# Patient Record
Sex: FEMALE | Race: WHITE | NOT HISPANIC OR LATINO | Employment: FULL TIME | URBAN - METROPOLITAN AREA
[De-identification: names, ages, dates, MRNs, and addresses within clinical notes are randomized per-mention and may not be internally consistent; named-entity substitution may affect disease eponyms.]

---

## 2018-05-31 ENCOUNTER — TRANSCRIBE ORDERS (OUTPATIENT)
Dept: ADMINISTRATIVE | Facility: HOSPITAL | Age: 48
End: 2018-05-31

## 2018-05-31 DIAGNOSIS — R61 GENERALIZED HYPERHIDROSIS: Primary | ICD-10-CM

## 2018-06-01 ENCOUNTER — HOSPITAL ENCOUNTER (OUTPATIENT)
Dept: RADIOLOGY | Age: 48
Discharge: HOME | End: 2018-06-01
Payer: COMMERCIAL

## 2018-06-01 DIAGNOSIS — R61 GENERALIZED HYPERHIDROSIS: ICD-10-CM

## 2018-06-01 PROCEDURE — 76536 US EXAM OF HEAD AND NECK: CPT

## 2018-06-21 ENCOUNTER — TRANSCRIBE ORDERS (OUTPATIENT)
Dept: ADMINISTRATIVE | Facility: HOSPITAL | Age: 48
End: 2018-06-21

## 2018-06-21 ENCOUNTER — HOSPITAL ENCOUNTER (INPATIENT)
Facility: HOSPITAL | Age: 48
LOS: 1 days | Discharge: HOME | DRG: 312 | End: 2018-06-23
Attending: EMERGENCY MEDICINE | Admitting: INTERNAL MEDICINE
Payer: COMMERCIAL

## 2018-06-21 ENCOUNTER — APPOINTMENT (EMERGENCY)
Dept: RADIOLOGY | Facility: HOSPITAL | Age: 48
DRG: 312 | End: 2018-06-21
Payer: COMMERCIAL

## 2018-06-21 DIAGNOSIS — R06.00 DYSPNEA ON EXERTION: Primary | ICD-10-CM

## 2018-06-21 DIAGNOSIS — R22.1 NECK MASS: Primary | ICD-10-CM

## 2018-06-21 DIAGNOSIS — R07.9 CHEST PAIN, UNSPECIFIED TYPE: ICD-10-CM

## 2018-06-21 DIAGNOSIS — R07.89 OTHER CHEST PAIN: ICD-10-CM

## 2018-06-21 DIAGNOSIS — R79.89 ABNORMAL THYROID BLOOD TEST: ICD-10-CM

## 2018-06-21 DIAGNOSIS — E07.89 THYROID MASS OF UNCLEAR ETIOLOGY: ICD-10-CM

## 2018-06-21 DIAGNOSIS — D50.9 MICROCYTIC ANEMIA: ICD-10-CM

## 2018-06-21 DIAGNOSIS — R00.2 PALPITATIONS: ICD-10-CM

## 2018-06-21 PROBLEM — Z72.0 TOBACCO ABUSE: Status: ACTIVE | Noted: 2018-06-21

## 2018-06-21 PROBLEM — Z98.84 HISTORY OF GASTRIC BYPASS: Status: ACTIVE | Noted: 2018-06-21

## 2018-06-21 LAB
ALBUMIN SERPL BCP-MCNC: 3.2 G/DL (ref 3.5–5)
ALP SERPL-CCNC: 97 U/L (ref 46–116)
ALT SERPL W P-5'-P-CCNC: 28 U/L (ref 12–78)
ANION GAP SERPL CALCULATED.3IONS-SCNC: 10 MMOL/L (ref 4–13)
APTT PPP: 26 SECONDS (ref 24–36)
AST SERPL W P-5'-P-CCNC: 24 U/L (ref 5–45)
BACTERIA UR QL AUTO: ABNORMAL /HPF
BASOPHILS # BLD AUTO: 0.04 THOUSANDS/ΜL (ref 0–0.1)
BASOPHILS NFR BLD AUTO: 1 % (ref 0–1)
BILIRUB SERPL-MCNC: 0.3 MG/DL (ref 0.2–1)
BILIRUB UR QL STRIP: NEGATIVE
BUN SERPL-MCNC: 16 MG/DL (ref 5–25)
CALCIUM SERPL-MCNC: 8.7 MG/DL (ref 8.3–10.1)
CHLORIDE SERPL-SCNC: 105 MMOL/L (ref 100–108)
CLARITY UR: CLEAR
CO2 SERPL-SCNC: 25 MMOL/L (ref 21–32)
COLOR UR: YELLOW
CREAT SERPL-MCNC: 0.7 MG/DL (ref 0.6–1.3)
DEPRECATED D DIMER PPP: 296 NG/ML (FEU) (ref 0–424)
EOSINOPHIL # BLD AUTO: 0.44 THOUSAND/ΜL (ref 0–0.61)
EOSINOPHIL NFR BLD AUTO: 5 % (ref 0–6)
ERYTHROCYTE [DISTWIDTH] IN BLOOD BY AUTOMATED COUNT: 17.4 % (ref 11.6–15.1)
GFR SERPL CREATININE-BSD FRML MDRD: 103 ML/MIN/1.73SQ M
GLUCOSE SERPL-MCNC: 100 MG/DL (ref 65–140)
GLUCOSE UR STRIP-MCNC: NEGATIVE MG/DL
HCT VFR BLD AUTO: 33.8 % (ref 34.8–46.1)
HGB BLD-MCNC: 10.5 G/DL (ref 11.5–15.4)
HGB UR QL STRIP.AUTO: NEGATIVE
INR PPP: 0.97 (ref 0.86–1.17)
KETONES UR STRIP-MCNC: NEGATIVE MG/DL
LEUKOCYTE ESTERASE UR QL STRIP: ABNORMAL
LYMPHOCYTES # BLD AUTO: 2.24 THOUSANDS/ΜL (ref 0.6–4.47)
LYMPHOCYTES NFR BLD AUTO: 28 % (ref 14–44)
MAGNESIUM SERPL-MCNC: 2 MG/DL (ref 1.6–2.6)
MCH RBC QN AUTO: 22.9 PG (ref 26.8–34.3)
MCHC RBC AUTO-ENTMCNC: 31.1 G/DL (ref 31.4–37.4)
MCV RBC AUTO: 74 FL (ref 82–98)
MONOCYTES # BLD AUTO: 0.5 THOUSAND/ΜL (ref 0.17–1.22)
MONOCYTES NFR BLD AUTO: 6 % (ref 4–12)
NEUTROPHILS # BLD AUTO: 4.89 THOUSANDS/ΜL (ref 1.85–7.62)
NEUTS SEG NFR BLD AUTO: 60 % (ref 43–75)
NITRITE UR QL STRIP: NEGATIVE
NON-SQ EPI CELLS URNS QL MICRO: ABNORMAL /HPF
PH UR STRIP.AUTO: 7 [PH] (ref 4.5–8)
PHOSPHATE SERPL-MCNC: 3.5 MG/DL (ref 2.7–4.5)
PLATELET # BLD AUTO: 300 THOUSANDS/UL (ref 149–390)
PMV BLD AUTO: 10.1 FL (ref 8.9–12.7)
POTASSIUM SERPL-SCNC: 3.6 MMOL/L (ref 3.5–5.3)
PROT SERPL-MCNC: 6.6 G/DL (ref 6.4–8.2)
PROT UR STRIP-MCNC: NEGATIVE MG/DL
PROTHROMBIN TIME: 12.6 SECONDS (ref 11.8–14.2)
RBC # BLD AUTO: 4.58 MILLION/UL (ref 3.81–5.12)
RBC #/AREA URNS AUTO: ABNORMAL /HPF
RETICS # AUTO: NORMAL 10*3/UL (ref 14097–95744)
RETICS # CALC: 0.81 % (ref 0.37–1.87)
SODIUM SERPL-SCNC: 140 MMOL/L (ref 136–145)
SP GR UR STRIP.AUTO: 1.01 (ref 1–1.03)
TROPONIN I SERPL-MCNC: <0.02 NG/ML
UROBILINOGEN UR QL STRIP.AUTO: 0.2 E.U./DL
WBC # BLD AUTO: 8.11 THOUSAND/UL (ref 4.31–10.16)
WBC #/AREA URNS AUTO: ABNORMAL /HPF

## 2018-06-21 PROCEDURE — 85045 AUTOMATED RETICULOCYTE COUNT: CPT | Performed by: INTERNAL MEDICINE

## 2018-06-21 PROCEDURE — 83550 IRON BINDING TEST: CPT | Performed by: INTERNAL MEDICINE

## 2018-06-21 PROCEDURE — 84100 ASSAY OF PHOSPHORUS: CPT | Performed by: INTERNAL MEDICINE

## 2018-06-21 PROCEDURE — 85730 THROMBOPLASTIN TIME PARTIAL: CPT | Performed by: PHYSICIAN ASSISTANT

## 2018-06-21 PROCEDURE — 83735 ASSAY OF MAGNESIUM: CPT | Performed by: INTERNAL MEDICINE

## 2018-06-21 PROCEDURE — 71046 X-RAY EXAM CHEST 2 VIEWS: CPT

## 2018-06-21 PROCEDURE — 93005 ELECTROCARDIOGRAM TRACING: CPT

## 2018-06-21 PROCEDURE — 99285 EMERGENCY DEPT VISIT HI MDM: CPT

## 2018-06-21 PROCEDURE — 96360 HYDRATION IV INFUSION INIT: CPT

## 2018-06-21 PROCEDURE — 85379 FIBRIN DEGRADATION QUANT: CPT | Performed by: INTERNAL MEDICINE

## 2018-06-21 PROCEDURE — 82728 ASSAY OF FERRITIN: CPT | Performed by: INTERNAL MEDICINE

## 2018-06-21 PROCEDURE — 36415 COLL VENOUS BLD VENIPUNCTURE: CPT | Performed by: INTERNAL MEDICINE

## 2018-06-21 PROCEDURE — 80053 COMPREHEN METABOLIC PANEL: CPT | Performed by: PHYSICIAN ASSISTANT

## 2018-06-21 PROCEDURE — 81001 URINALYSIS AUTO W/SCOPE: CPT

## 2018-06-21 PROCEDURE — 84484 ASSAY OF TROPONIN QUANT: CPT | Performed by: PHYSICIAN ASSISTANT

## 2018-06-21 PROCEDURE — 82607 VITAMIN B-12: CPT | Performed by: INTERNAL MEDICINE

## 2018-06-21 PROCEDURE — 83540 ASSAY OF IRON: CPT | Performed by: INTERNAL MEDICINE

## 2018-06-21 PROCEDURE — 99220 PR INITIAL OBSERVATION CARE/DAY 70 MINUTES: CPT | Performed by: INTERNAL MEDICINE

## 2018-06-21 PROCEDURE — 82746 ASSAY OF FOLIC ACID SERUM: CPT | Performed by: INTERNAL MEDICINE

## 2018-06-21 PROCEDURE — 85610 PROTHROMBIN TIME: CPT | Performed by: PHYSICIAN ASSISTANT

## 2018-06-21 PROCEDURE — 85025 COMPLETE CBC W/AUTO DIFF WBC: CPT | Performed by: PHYSICIAN ASSISTANT

## 2018-06-21 RX ORDER — MELATONIN
5000 DAILY
Status: DISCONTINUED | OUTPATIENT
Start: 2018-06-22 | End: 2018-06-23 | Stop reason: HOSPADM

## 2018-06-21 RX ORDER — ACETAMINOPHEN 325 MG/1
650 TABLET ORAL EVERY 6 HOURS PRN
Status: DISCONTINUED | OUTPATIENT
Start: 2018-06-21 | End: 2018-06-23 | Stop reason: HOSPADM

## 2018-06-21 RX ORDER — MELATONIN
5000 DAILY
COMMUNITY
End: 2019-10-08

## 2018-06-21 RX ORDER — NITROGLYCERIN 0.4 MG/1
0.4 TABLET SUBLINGUAL
Status: DISCONTINUED | OUTPATIENT
Start: 2018-06-21 | End: 2018-06-23 | Stop reason: HOSPADM

## 2018-06-21 RX ADMIN — SODIUM CHLORIDE 1000 ML: 0.9 INJECTION, SOLUTION INTRAVENOUS at 21:08

## 2018-06-22 ENCOUNTER — APPOINTMENT (INPATIENT)
Dept: CT IMAGING | Facility: HOSPITAL | Age: 48
DRG: 312 | End: 2018-06-22
Payer: COMMERCIAL

## 2018-06-22 LAB
FERRITIN SERPL-MCNC: 5 NG/ML (ref 8–388)
FOLATE SERPL-MCNC: 19.8 NG/ML (ref 3.1–17.5)
IRON SATN MFR SERPL: 4 %
IRON SERPL-MCNC: 21 UG/DL (ref 50–170)
PLATELET # BLD AUTO: 296 THOUSANDS/UL (ref 149–390)
PMV BLD AUTO: 10.8 FL (ref 8.9–12.7)
T3 SERPL-MCNC: 0.9 NG/ML (ref 0.6–1.8)
TIBC SERPL-MCNC: 535 UG/DL (ref 250–450)
TROPONIN I SERPL-MCNC: <0.02 NG/ML
TSH SERPL DL<=0.05 MIU/L-ACNC: 1.56 UIU/ML (ref 0.36–3.74)
VIT B12 SERPL-MCNC: 287 PG/ML (ref 100–900)

## 2018-06-22 PROCEDURE — 99254 IP/OBS CNSLTJ NEW/EST MOD 60: CPT | Performed by: INTERNAL MEDICINE

## 2018-06-22 PROCEDURE — 84443 ASSAY THYROID STIM HORMONE: CPT | Performed by: PHYSICIAN ASSISTANT

## 2018-06-22 PROCEDURE — 99232 SBSQ HOSP IP/OBS MODERATE 35: CPT | Performed by: PHYSICIAN ASSISTANT

## 2018-06-22 PROCEDURE — 70498 CT ANGIOGRAPHY NECK: CPT

## 2018-06-22 PROCEDURE — 84484 ASSAY OF TROPONIN QUANT: CPT | Performed by: INTERNAL MEDICINE

## 2018-06-22 PROCEDURE — 85049 AUTOMATED PLATELET COUNT: CPT | Performed by: INTERNAL MEDICINE

## 2018-06-22 PROCEDURE — 84480 ASSAY TRIIODOTHYRONINE (T3): CPT | Performed by: PHYSICIAN ASSISTANT

## 2018-06-22 PROCEDURE — 70496 CT ANGIOGRAPHY HEAD: CPT

## 2018-06-22 RX ORDER — LORAZEPAM 2 MG/ML
0.5 INJECTION INTRAMUSCULAR 4 TIMES DAILY PRN
Status: DISCONTINUED | OUTPATIENT
Start: 2018-06-22 | End: 2018-06-23 | Stop reason: HOSPADM

## 2018-06-22 RX ORDER — FERROUS SULFATE 325(65) MG
325 TABLET ORAL 2 TIMES DAILY WITH MEALS
Status: DISCONTINUED | OUTPATIENT
Start: 2018-06-22 | End: 2018-06-23 | Stop reason: HOSPADM

## 2018-06-22 RX ORDER — POTASSIUM CHLORIDE 20 MEQ/1
40 TABLET, EXTENDED RELEASE ORAL ONCE
Status: COMPLETED | OUTPATIENT
Start: 2018-06-22 | End: 2018-06-22

## 2018-06-22 RX ORDER — DOCUSATE SODIUM 100 MG/1
100 CAPSULE, LIQUID FILLED ORAL 2 TIMES DAILY
Status: DISCONTINUED | OUTPATIENT
Start: 2018-06-22 | End: 2018-06-23 | Stop reason: HOSPADM

## 2018-06-22 RX ORDER — ALBUTEROL SULFATE 90 UG/1
2 AEROSOL, METERED RESPIRATORY (INHALATION) EVERY 4 HOURS PRN
Status: DISCONTINUED | OUTPATIENT
Start: 2018-06-22 | End: 2018-06-23 | Stop reason: HOSPADM

## 2018-06-22 RX ADMIN — IOHEXOL 85 ML: 350 INJECTION, SOLUTION INTRAVENOUS at 20:40

## 2018-06-22 RX ADMIN — DOCUSATE SODIUM 100 MG: 100 CAPSULE, LIQUID FILLED ORAL at 17:41

## 2018-06-22 RX ADMIN — FERROUS SULFATE TAB 325 MG (65 MG ELEMENTAL FE) 325 MG: 325 (65 FE) TAB at 17:41

## 2018-06-22 RX ADMIN — CHOLECALCIFEROL TAB 25 MCG (1000 UNIT) 5000 UNITS: 25 TAB at 08:45

## 2018-06-22 RX ADMIN — POTASSIUM CHLORIDE 40 MEQ: 1500 TABLET, EXTENDED RELEASE ORAL at 12:29

## 2018-06-22 NOTE — ASSESSMENT & PLAN NOTE
- hemoglobin of 11 2 last month at [facility] the dropped to 10 5 today (similar MCV in the mid 70s compared to last month)   - check iron panel and initiate supplementation if deficiency noted - h/o gastric bypass w/ failure to continue vitamin/mineral supplementation noted (see below)

## 2018-06-22 NOTE — ED PROVIDER NOTES
History  Chief Complaint   Patient presents with    Chest Pain     Running after grandson, not able to breath  Krystle Carota to the ground  Family reports she was gasping for air and did not respond for about 10 minutes  Patient reports chest discomfort that radiates down the left arm  Has recently been having cp and a stress test about 3 weeks ago  80-year-old female presents to the emergency department with complaints of shortness of breath  States she had a 10 minutes episode of difficulty breathing and "gasping for air" after running after her 3year-old granddaughter earlier today  States that she has had increasing shortness of breath with exertion over the past 3 weeks and is also having intermittent chest pain and palpitations  States she had a stress test done 3 weeks ago which was normal   Initially EMS with reports possible anxiety attack en route to the hospital   She denies previous history of anxiety disorder  History provided by:  Patient   used: No        Prior to Admission Medications   Prescriptions Last Dose Informant Patient Reported? Taking? cholecalciferol (VITAMIN D3) 1,000 units tablet   Yes Yes   Sig: Take 5,000 Units by mouth daily      Facility-Administered Medications: None       Past Medical History:   Diagnosis Date    Anxiety     Disease of thyroid gland        Past Surgical History:   Procedure Laterality Date    GASTRIC BYPASS      HYSTERECTOMY         History reviewed  No pertinent family history  I have reviewed and agree with the history as documented  Social History   Substance Use Topics    Smoking status: Current Every Day Smoker    Smokeless tobacco: Never Used    Alcohol use Yes      Comment: occassional        Review of Systems   Constitutional: Negative for activity change, appetite change, chills and fever     HENT: Negative for congestion, dental problem, drooling, ear discharge, ear pain, mouth sores, nosebleeds, rhinorrhea, sore throat and trouble swallowing  Eyes: Negative for pain, discharge and itching  Respiratory: Positive for shortness of breath  Negative for cough, chest tightness and wheezing  Cardiovascular: Negative for chest pain and palpitations  Gastrointestinal: Negative for abdominal pain, blood in stool, constipation, diarrhea, nausea and vomiting  Endocrine: Negative for cold intolerance and heat intolerance  Genitourinary: Negative for difficulty urinating, dysuria, flank pain, frequency and urgency  Skin: Negative for rash and wound  Allergic/Immunologic: Negative for food allergies and immunocompromised state  Neurological: Negative for dizziness, seizures, syncope, weakness, numbness and headaches  Psychiatric/Behavioral: Negative for agitation, behavioral problems and confusion  Physical Exam  Physical Exam   Constitutional: She is oriented to person, place, and time  She appears well-developed and well-nourished  No distress  HENT:   Head: Normocephalic and atraumatic  Right Ear: External ear normal    Left Ear: External ear normal    Mouth/Throat: Oropharynx is clear and moist  No oropharyngeal exudate  Eyes: Conjunctivae are normal    Neck: No JVD present  No tracheal deviation present  Cardiovascular: Normal rate, regular rhythm and normal heart sounds  Exam reveals no gallop and no friction rub  No murmur heard  Pulmonary/Chest: Effort normal and breath sounds normal  No respiratory distress  She has no wheezes  She has no rales  She exhibits no tenderness  Abdominal: Soft  Bowel sounds are normal  She exhibits no distension  There is no tenderness  There is no guarding  Musculoskeletal: Normal range of motion  She exhibits no edema, tenderness or deformity  Lymphadenopathy:     She has no cervical adenopathy  Neurological: She is alert and oriented to person, place, and time  Skin: Skin is warm and dry  No rash noted  She is not diaphoretic  No erythema  There is pallor  Psychiatric: She has a normal mood and affect  Her behavior is normal    Nursing note and vitals reviewed        Vital Signs  ED Triage Vitals [06/21/18 1927]   Temperature Pulse Respirations Blood Pressure SpO2   98 8 °F (37 1 °C) 80 20 136/79 94 %      Temp Source Heart Rate Source Patient Position - Orthostatic VS BP Location FiO2 (%)   Oral Monitor Lying Right arm --      Pain Score       No Pain           Vitals:    06/21/18 1927 06/21/18 2000 06/21/18 2015 06/21/18 2215   BP: 136/79 116/76 115/75 103/61   Pulse: 80 80 76 82   Patient Position - Orthostatic VS: Lying          Visual Acuity      ED Medications  Medications    EMS REPLENISHMENT MED (not administered)   sodium chloride 0 9 % bolus 1,000 mL (0 mL Intravenous Stopped 6/21/18 2208)       Diagnostic Studies  Results Reviewed     Procedure Component Value Units Date/Time    Urine Microscopic [[id number]]  (Abnormal) Collected:  06/21/18 2043    Lab Status:  Final result Specimen:  Urine from Urine, Clean Catch Updated:  06/21/18 2155     RBC, UA 0-1 (A) /hpf      WBC, UA 0-5 /hpf      Epithelial Cells Occasional /hpf      Bacteria, UA None Seen /hpf     Troponin I [[id number]]  (Normal) Collected:  06/21/18 2107    Lab Status:  Final result Specimen:  Blood from Arm, Left Updated:  06/21/18 2142     Troponin I <0 02 ng/mL     Comprehensive metabolic panel [[id number]]  (Abnormal) Collected:  06/21/18 2107    Lab Status:  Final result Specimen:  Blood from Arm, Left Updated:  06/21/18 2140     Sodium 140 mmol/L      Potassium 3 6 mmol/L      Chloride 105 mmol/L      CO2 25 mmol/L      Anion Gap 10 mmol/L      BUN 16 mg/dL      Creatinine 0 70 mg/dL      Glucose 100 mg/dL      Calcium 8 7 mg/dL      AST 24 U/L      ALT 28 U/L      Alkaline Phosphatase 97 U/L      Total Protein 6 6 g/dL      Albumin 3 2 (L) g/dL      Total Bilirubin 0 30 mg/dL      eGFR 103 ml/min/1 73sq m     Narrative:         National Kidney Disease Education Program recommendations are as follows:  GFR calculation is accurate only with a steady state creatinine  Chronic Kidney disease less than 60 ml/min/1 73 sq  meters  Kidney failure less than 15 ml/min/1 73 sq  meters      Protime-INR [[id number]]  (Normal) Collected:  06/21/18 2107    Lab Status:  Final result Specimen:  Blood from Arm, Left Updated:  06/21/18 2133     Protime 12 6 seconds      INR 0 97    APTT [[id number]]  (Normal) Collected:  06/21/18 2107    Lab Status:  Final result Specimen:  Blood from Arm, Left Updated:  06/21/18 2133     PTT 26 seconds     CBC and differential [[id number]]  (Abnormal) Collected:  06/21/18 2107    Lab Status:  Final result Specimen:  Blood from Arm, Left Updated:  06/21/18 2114     WBC 8 11 Thousand/uL      RBC 4 58 Million/uL      Hemoglobin 10 5 (L) g/dL      Hematocrit 33 8 (L) %      MCV 74 (L) fL      MCH 22 9 (L) pg      MCHC 31 1 (L) g/dL      RDW 17 4 (H) %      MPV 10 1 fL      Platelets 779 Thousands/uL      Neutrophils Relative 60 %      Lymphocytes Relative 28 %      Monocytes Relative 6 %      Eosinophils Relative 5 %      Basophils Relative 1 %      Neutrophils Absolute 4 89 Thousands/µL      Lymphocytes Absolute 2 24 Thousands/µL      Monocytes Absolute 0 50 Thousand/µL      Eosinophils Absolute 0 44 Thousand/µL      Basophils Absolute 0 04 Thousands/µL     ED Urine Macroscopic [[id number]]  (Abnormal) Collected:  06/21/18 2043    Lab Status:  Final result Specimen:  Urine Updated:  06/21/18 2038     Color, UA Yellow     Clarity, UA Clear     pH, UA 7 0     Leukocytes, UA Trace (A)     Nitrite, UA Negative     Protein, UA Negative mg/dl      Glucose, UA Negative mg/dl      Ketones, UA Negative mg/dl      Urobilinogen, UA 0 2 E U /dl      Bilirubin, UA Negative     Blood, UA Negative     Specific Gravity, UA 1 015    Narrative:       CLINITEK RESULT                 XR chest 2 views   ED Interpretation by Trena Mcclelland PA-C (06/21 2107)   Clear lungs Procedures  ECG 12 Lead Documentation  Date/Time: 6/21/2018 9:52 PM  Performed by: Jennifer Pope by: Carla Castro     Indications / Diagnosis:  SOB  ECG reviewed by me, the ED Provider: yes    Patient location:  ED  Previous ECG:     Previous ECG:  Compared to current    Comparison ECG info:  5/3/04    Similarity:  No change    Comparison to cardiac monitor: Yes    Interpretation:     Interpretation: normal    Rate:     ECG rate:  74    ECG rate assessment: normal    Rhythm:     Rhythm: sinus rhythm    Ectopy:     Ectopy: none    QRS:     QRS axis:  Normal    QRS intervals:  Normal  Conduction:     Conduction: normal    ST segments:     ST segments:  Normal  T waves:     T waves: normal             Phone Contacts  ED Phone Contact    ED Course                               MDM  Number of Diagnoses or Management Options  Dyspnea on exertion:   Diagnosis management comments:   Differential diagnosis includes but not limited to:    Acute coronary syndrome, anxiety, anemia  Amount and/or Complexity of Data Reviewed  Clinical lab tests: ordered and reviewed  Tests in the radiology section of CPT®: ordered and reviewed  Independent visualization of images, tracings, or specimens: yes      CritCare Time    Disposition  Final diagnoses:   Dyspnea on exertion     Time reflects when diagnosis was documented in both MDM as applicable and the Disposition within this note     Time User Action Codes Description Comment    6/21/2018 10:02 PM Chinyere Sepncer Add [R06 09] Dyspnea on exertion     6/21/2018 10:20 PM Richard Yanes Add [R07 9] Chest pain, unspecified type     6/21/2018 10:20 PM Richard Yanes Modify [R07 9] Chest pain, unspecified type       ED Disposition     ED Disposition Condition Comment    Admit  Case was discussed with [facility] and the patient's admission status was agreed to be Admission Status: observation status to the service of Dr Fransico River           Follow-up Information    None Patient's Medications   Discharge Prescriptions    No medications on file     No discharge procedures on file      ED Provider  Electronically Signed by           Jessica Urbano PA-C  06/21/18 5045

## 2018-06-22 NOTE — ASSESSMENT & PLAN NOTE
- recent stress echocardiogram at [facility] on 5/11/18 was unremarkable for acute ischemia or significant structural disease per report - patient notes episodic symptoms of shortness of breath and anxiety/palpitations induced by stress/emotional triggers as she had a somewhat peaceful altercation with her daughter's landlord who was threatening to evict her and shortly afterwards while playing with her grandson had sudden shortness of breath with exertion and physical activity  - symptoms nearly spontaneously resolved and she currently denies any chest pain or shortness of breath  - no family history of blood clots noted - will check D-dimer and if abnormal, can possibly consider imaging to rule out pulmonary embolism though seems clinically less likely   - TSH and free T4 level earlier this month were within normal limits although thyroid peroxidase Ab and thyroglobulin Ab were elevated (at [facility]) - a subsequent thyroid ultrasound here at [facility] revealed a 1 7 cm left upper lobe nodule deemed cystic in nature hence not requiring further evaluation  - patient also reports cessation of caffeinated beverages recently  - monitor overnight on telemetry   - microcytic anemia noted (see plan below)

## 2018-06-22 NOTE — H&P
History & Physical - [facility] Hospitalist Service - Internal Medicine        PATIENT INFORMATION      Norma Medrano 50 y o  female MRN: [id number]  Unit/Bed#: -01 Encounter: [id number]  Admitting Physician: Renato Mahan MD  PCP: Aki Barrow DO  Date of Admission:  06/21/18      ASSESSMENTS & PLAN       Chest pain - Shortness of breath with exertion    Assessment & Plan    - recent stress echocardiogram at [facility] on 5/11/18 was unremarkable for acute ischemia or significant structural disease per report - patient notes episodic symptoms of shortness of breath and anxiety/palpitations induced by stress/emotional triggers as she had a somewhat peaceful altercation with her daughter's landlord who was threatening to evict her and shortly afterwards while playing with her grandson had sudden shortness of breath with exertion and physical activity  - symptoms nearly spontaneously resolved and she currently denies any chest pain or shortness of breath  - no family history of blood clots noted - will check D-dimer and if abnormal, can possibly consider imaging to rule out pulmonary embolism though seems clinically less likely   - TSH and free T4 level earlier this month were within normal limits although thyroid peroxidase Ab and thyroglobulin Ab were elevated (at [facility]) - a subsequent thyroid ultrasound here at [facility] revealed a 1 7 cm left upper lobe nodule deemed cystic in nature hence not requiring further evaluation  - patient also reports cessation of caffeinated beverages recently  - monitor overnight on telemetry   - microcytic anemia noted (see plan below)       Tobacco abuse   Assessment & Plan    - counseled on cessation - transdermal nicotine patch ordered during hospital course      Microcytic anemia   Assessment & Plan    - hemoglobin of 11 2 last month at [facility] the dropped to 10 5 today (similar MCV in the mid 70s compared to last month)   - check iron panel with reticulocyte count and initiate supplementation if deficiency noted - h/o gastric bypass w/ failure to continue vitamin/mineral supplementation noted (see below)       History of gastric bypass   Assessment & Plan    - approximately 10-12 years ago - took multivitamins for a few months afterwards at that time but has not taken any further supplementation since   - does complain of intermittent extremity numbness/weakness especially over the last few months - check iron panel and folate/B12 levels         DVT Prophylaxis:  Lovenox      CHIEF COMPLAINT      Shortness of breath on exertion      HISTORY OF PRESENT ILLNESS      Marvin Denney is a 50 y o  female who presents with complaints of shortness of breath with chest discomfort as she was playing with her grandson earlier today  She notes that she has been having on and off shortness of breath with palpitations and occasional night sweats over the last few months  Notably, she underwent a stress echocardiogram at [facility] last month that was negative for acute ischemia or notable structural heart disease  Earlier today, she noted getting into a peaceful altercation with her daughter's landlord who was threatening to evict her daughter  Per her  at bedside, she is not too good at expressing her motions and was holding in anger the entire time  She denies any recent other stress in her life over the last few months but states that she occasionally has these palpitations and her heart with occasional arm tremors  Earlier this month a thyroid panel that was checked was unremarkable although thyroid peroxidase antibody and thyroglobulin antibody levels were elevated which prompted a thyroid ultrasound which was negative for acute findings other than an isolated cystic nodule    She also underwent gastric bypass surgery approximately 10-12 years ago, and although instructed to maintain a regimen of vitamin/mineral supplementation, she was only compliant with this for a month afterwards  She currently only takes a vitamin-D tablet  Bloodwork at [facility] a month ago did reveal microcytic anemia which was confirmed on blood work today in our ER  She cannot recall if she was ever diagnosed with iron deficiency anemia although she does complain of worsening fatigue in general even without exertion over the last few months  Upon my encounter, her  is present at bedside and she denies any acute complaints  She acknowledges that she recently switched from caffeinated beverages to "decaffeinated" coffee per insistence of her outpatient cardiologist   She remains in pleasant/hopeful spirits  REVIEW OF SYSTEMS      Review of Systems - A thorough 12 point review systems was conducted  Pertinent positives and negatives are mentioned in the history of present illness  PAST MEDICAL & SURGICAL HISTORY      Past Medical History:   Diagnosis Date    Anxiety     Disease of thyroid gland        Past Surgical History:   Procedure Laterality Date    GASTRIC BYPASS      HYSTERECTOMY           MEDICATIONS & ALLERGIES       Prior to Admission medications    Medication Sig Start Date End Date Taking? Authorizing Provider   cholecalciferol (VITAMIN D3) 1,000 units tablet Take 5,000 Units by mouth daily   Yes Historical Provider, MD         Allergies:    Allergies   Allergen Reactions    Tetracyclines & Related Anaphylaxis    Percocet [Oxycodone-Acetaminophen] GI Intolerance         SOCIAL HISTORY         Marital Status:   Patient Pre-hospital Living Situation:  Lives at home  Patient Pre-hospital Level of Mobility:  Freely ambulates    Substance Use History:   History   Alcohol Use    Yes     Comment: occassional     History   Smoking Status    Current Every Day Smoker   Smokeless Tobacco    Never Used     History   Drug Use No         FAMILY HISTORY      Significant heart disease in various members of her maternal side  Cannot recall significant paternal family history at this time  PHYSICAL EXAM      Vitals:   Blood Pressure: 103/61 (06/21/18 2215)  Pulse: 82 (06/21/18 2215)  Temperature: 98 8 °F (37 1 °C) (06/21/18 1927)  Temp Source: Oral (06/21/18 1927)  Respirations: 20 (06/21/18 2215)  Weight - Scale: 75 kg (165 lb 5 5 oz) (06/21/18 1928)  SpO2: 97 % (06/21/18 2215)      GENERAL:  Well-developed/nourished - no immediate distress  HEAD:  Normocephalic - atraumatic  EYES: PERRL - EOMI   MOUTH:  Mucosa moist  NECK:  Supple - full range of motion  CARDIAC:  Regular rate/rhythm - S1/S2 positive  PULMONARY:  Clear breath sounds bilaterally - nonlabored respirations  ABDOMEN:  Soft - nontender/nondistended - active bowel sounds  MUSCULOSKELETAL:  Motor strength/range of motion intact  NEUROLOGIC:  Alert/oriented x 3 - cranial nerves grossly intact - no fine tremors noted  SKIN:  Age-appropriate wrinkles/blemishes   PSYCHIATRIC:  Mood/affect age-appropriate      ADDITIONAL DATA     Lab Results:       Results from last 7 days  Lab Units 06/21/18  2107   WBC Thousand/uL 8 11   HEMOGLOBIN g/dL 10 5*   HEMATOCRIT % 33 8*   PLATELETS Thousands/uL 300   NEUTROS PCT % 60   LYMPHS PCT % 28   MONOS PCT % 6   EOS PCT % 5       Results from last 7 days  Lab Units 06/21/18  2107   SODIUM mmol/L 140   POTASSIUM mmol/L 3 6   CHLORIDE mmol/L 105   CO2 mmol/L 25   BUN mg/dL 16   CREATININE mg/dL 0 70   CALCIUM mg/dL 8 7   TOTAL PROTEIN g/dL 6 6   BILIRUBIN TOTAL mg/dL 0 30   ALK PHOS U/L 97   ALT U/L 28   AST U/L 24   GLUCOSE RANDOM mg/dL 100       Results from last 7 days  Lab Units 06/21/18  2107   INR  0 97       Imaging:     Us Thyroid    Result Date: 6/4/2018  Narrative: THYROID ULTRASOUND INDICATION:    Night sweats, chest palpitations   COMPARISON:  1/6/2009 TECHNIQUE:   Ultrasound of the thyroid was performed with a high frequency linear transducer in transverse and sagittal planes including volumetric imaging sweeps as well as traditional still imaging technique  FINDINGS:  Normal homogeneous smooth echotexture  Right lobe:  1 9 x 1 7 x 5 8 cm  Left lobe:  1 7 x 1 6 x 5 4 cm  Isthmus:  0 2 cm  Nodule #1 LEFT upper pole nodule measuring 0 5 x 0 6 x 1 7 cm  Not present previously  This is posterior in location though likely within the thyroid as there appears to be normal thyroid tissue surrounding the lesion  COMPOSITION:  0 points, cystic or almost completely cystic  ECHOGENICITY:  0 points, anechoic  SHAPE:  0 points, wider-than-tall  MARGIN: 0 points, smooth  ECHOGENIC FOCI:  0 points, none or large comet-tail artifacts  TI-RADS Classification: TR 1 (0 points), Benign  No FNA  Impression: New left upper pole nodule measuring 1 7 cm  This appears cystic or almost entirely cystic  There are no solid components or internal vascularity  This does not meet ultrasound criteria for fine-needle aspiration or follow-up unless otherwise clinically indicated  Reference: ACR Thyroid Imaging, Reporting and Data System (TI-RADS): White Paper of the [facility] [employer]  J AM Felipe Radiol 0379;08:[id number]  (additional recommendations based on American Thyroid Association 2015 guidelines ) Workstation performed: [id number]       Code Status:  Full code      Anticipated Length of Stay:  Patient will be admitted on an Observation basis with an anticipated length of stay of  less than 2 midnights  Justification for Hospital Stay:  Shortness of breath with chest discomfort which has been intermittent over the last few months requiring overnight observation and telemetry monitoring and workup for microcytic anemia which may be contributory to shortness of breath  Total Time for Visit, including Counseling / Coordination of Care: 63 minutes    Greater than 50% of this total time spent on direct patient counseling and coordination of care        ** Please Note: This note is constructed using a voice recognition dictation system   **

## 2018-06-22 NOTE — ASSESSMENT & PLAN NOTE
- approximately 10-12 years ago - took multivitamins for a few months afterwards at that time but has not taken any further supplementation since   - does complain of intermittent extremity numbness/weakness especially over the last few months - check iron panel and folate/B12 levels

## 2018-06-22 NOTE — CASE MANAGEMENT
Thank you,  [address] [facility] in the [facility] by Cyrus Parks for 2017  Network Utilization Review Department  Phone: [phone]; Fax [phone]  ATTENTION: The Network Utilization Review Department is now centralized for our 7 Facilities  Make a note that we have a new phone and fax numbers for our Department  Please call with any questions or concerns to [phone] and carefully follow the prompts so that you are directed to the right person  All voicemails are confidential  Fax any determinations, approvals, denials, and requests for initial or continue stay review clinical to [phone]  Due to HIGH CALL volume, it would be easier if you could please send faxed requests to expedite your requests and in part, help us provide discharge notifications faster  Initial Clinical Review    Admission: Date/Time/Statement: OBSERVATION ADMISSION 06/21/2018 2204    Orders Placed This Encounter   Procedures    Place in Observation (expected length of stay for this patient is less than two midnights)     Standing Status:   Standing     Number of Occurrences:   1     Order Specific Question:   Admitting Physician     Answer:   Tom Chu [[id number]]     Order Specific Question:   Level of Care     Answer:   Med Surg [16]         ED: Date/Time/Mode of Arrival:   ED Arrival Information     Expected Arrival Acuity Means of Arrival Escorted By Service Admission Type    - 6/21/2018 19:20 [facility] Urgent    Arrival Complaint    Anxiety Attack          Chief Complaint:   Chief Complaint   Patient presents with    Chest Pain     Running after grandson, not able to breath  Estelita Linea to the ground  Family reports she was gasping for air and did not respond for about 10 minutes  Patient reports chest discomfort that radiates down the left arm  Has recently been having cp and a stress test about 3 weeks ago  History of Illness: 50 y o   Female presenting in ED with SOB and chest discomfort  Has been experiencing SOB with nights sweats as a new symptom  ED Vital Signs:   ED Triage Vitals [06/21/18 1927]   Temperature Pulse Respirations Blood Pressure SpO2   98 8 °F (37 1 °C) 80 20 136/79 94 %      Temp Source Heart Rate Source Patient Position - Orthostatic VS BP Location FiO2 (%)   Oral Monitor Lying Right arm --      Pain Score       No Pain        Wt Readings from Last 1 Encounters:   06/21/18 75 kg (165 lb 5 5 oz)       Vital Signs (abnormal): WNL    Abnormal Labs/Diagnostic Test Results: 06/21/2018; 3 2, Iron 21,ferritin 5, TIBC 535, Folate 19 8, hgb 10 5, hct 33 8  Urinalysis trace leukocytes, RBC 0-1   xr chest: 06/21/2018: New left upper pole nodule measuring 1 7 cm   This appears cystic or almost entirely cystic   There are no solid components or internal vascularity   This does not meet ultrasound criteria for fine-needle aspiration or follow-up unless otherwise   ED Treatment:   Medication Administration from 06/21/2018 1920 to 06/21/2018 2300       Date/Time Order Dose Route Action Action by Comments     06/21/2018 2208 sodium chloride 0 9 % bolus 1,000 mL 0 mL Intravenous Stopped Gayathri Toledo RN      06/21/2018 2108 sodium chloride 0 9 % bolus 1,000 mL 1,000 mL Intravenous New Bag Gayathri Toledo RN           Past Medical/Surgical History:    Active Ambulatory Problems     Diagnosis Date Noted    No Active Ambulatory Problems     Resolved Ambulatory Problems     Diagnosis Date Noted    No Resolved Ambulatory Problems     Past Medical History:   Diagnosis Date    Anxiety     Disease of thyroid gland        Admitting Diagnosis: Chest pain [R07 9]  Dyspnea on exertion [R06 09]  Chest pain, unspecified type [R07 9]    Age/Sex: 50 y o  female    Assessment/Plan:   Chest pain - Shortness of breath with exertion    Assessment & Plan   no family history of blood clots noted - will check D-dimer and if abnormal, can possibly consider imaging to rule out pulmonary embolism though seems clinically less likely   - TSH and free T4 level earlier this month were within normal limits although thyroid peroxidase Ab and thyroglobulin Ab were elevated (at [facility]) - a subsequent thyroid ultrasound here at [facility] revealed a 1 7 cm left upper lobe nodule deemed cystic in nature hence not requiring further evaluation  - patient also reports cessation of caffeinated beverages recently  - monitor overnight on telemetry   - microcytic anemia noted (see plan below)   Tobacco abuse   Assessment & Plan     - counseled on cessation - transdermal nicotine patch ordered during hospital course       Microcytic anemia   Assessment & Plan     - hemoglobin of 11 2 last month at [facility] the dropped to 10 5 today (similar MCV in the mid 70s compared to last month)   - check iron panel with reticulocyte count and initiate supplementation if deficiency noted - h/o gastric bypass w/ failure to continue vitamin/mineral supplementation noted (see below)          Admission Orders:  Scheduled Meds:   Current Facility-Administered Medications:  acetaminophen 650 mg Oral Q6H PRN   cholecalciferol 5,000 Units Oral Daily   enoxaparin 40 mg Subcutaneous Daily   nicotine 7 mg Transdermal Daily   nitroglycerin 0 4 mg Sublingual Q5 Min PRN     Continuous Infusions: NSS 1000ml bolus  PRN Meds:   regular diet  Act as janeth  24 hr tele  PIV  Tobacco cessation education  Urine dip  Vitals routine  Inpatient to Cardiology  SCD

## 2018-06-22 NOTE — PROGRESS NOTES
Progress Note - [facility] / Hospitalists  Rafa Hayden 50 y o  female MRN: [id number]  Unit/Bed#: -01 Encounter: [id number]      ASSESSMENT AND PLAN:  1  Chest pain with dyspnea and palpitation-the patient may have had a syncopal event after over exerting herself yesterday  Neurologically she is intact  The patient's troponins have been negative  Her telemetry has been unremarkable, her exercise tolerance in the hospital has been not able to reproduce the symptoms  Appreciate the prompt consult by Cardiology in the recommendations to follow up with her outpatient cardiologist for Holter monitor  Recent stress echo in May 2018 was normal without ischemia    2  Thyroid mass-unclear etiology  Previous ultrasound is showed likely cystic  Thyroid antibodies at [facility] were abnormal   However TSH was normal here T3 is pending -- previous TSH and T3 were normal at [facility]   The patient was referred for CT of the neck by her primary care doctor  Given the events above, abnormal thyroid antibodies do believe the CT of the head and neck is warranted here as well as an endocrinology consult  Patient is neurologically intact without any focal deficits    3  Microcytic anemia-likely secondary to iron deficiency  Hemoglobin 10 5  Iron 21  Patient is status post Geeta-en-Y gastric bypass  Recommended iron supplementation with stool softener  Check in 6 weeks    4  Vitamin-D deficiency-the patient was recently started on vitamin-D supplementation  Check a vitamin-D level  Possibly contributing overall    5  Status post gastric bypass [id number]-approximately 10 years ago  Suspect malabsorption may be contributing overall  Vitamin-D and iron supplementation per above    6  Hypokalemia-likely secondary to intake as well as the IVF/normal saline  Repleted with K-Dur  Check in the morning    7  Tobacco abuse-the patient was strongly counseled she must stop smoking    Continue patch  Albuterol HFA p r n  VTE Prophylaxis: Enoxaparin (Lovenox)    Dispo:  Do believe the patient needs a an additional midnight given the mass in her neck as well as the abnormal thyroid antibodies  Suspect it may be contributing to her dyspnea and palpitations  Do believe the patient needs an additional midnight to complete the workup and treatment  Check a CT of the head neck and appreciate Endocrinology input  Discussed with nursing and updated family at bedside  ______________________________________________________________________    SUBJECTIVE:   Patient seen and examined  Patient feels extreme fatigue  She says when she was ambulating she felt very short of breath on but no palpitations  She did have some lightheadedness  But she did not feel like she was about to pass out  She denies any pain  She denies any pain in her throat difficulty swallowing  She denies any headaches  No vision changes  No numbness or tingling  She further denies any substernal chest pain    No abdominal pain nausea vomiting diarrhea constipation or dysuria    OBJECTIVE:   Principal Problem:    Chest pain - Shortness of breath with exertion   Active Problems:    Tobacco abuse    Microcytic anemia    History of gastric bypass      Vitals:   HR:  [59-82] 59  Resp:  [18-20] 18  BP: (103-136)/(57-79) 104/57  SpO2:  [94 %-98 %] 96 %  Temp (24hrs), Av 4 °F (36 9 °C), Min:97 9 °F (36 6 °C), Max:98 8 °F (37 1 °C)  Current: Temperature: 98 5 °F (36 9 °C)    Intake/Output Summary (Last 24 hours) at 18 1606  Last data filed at 18 2208   Gross per 24 hour   Intake             1000 ml   Output                0 ml   Net             1000 ml       Physical Exam:     General Appearance:    Alert, cooperative, no distress   Head:    Normocephalic, without obvious abnormality, atraumatic   Eyes:    Conjunctiva/corneas clear, EOM's intact       Neck:   Supple, no adenopathy, no JVD   Back:     Symmetric, no curvature, ROM normal, no CVA tenderness   Lungs:     Clear to auscultation bilaterally, no wheezing or rhonchi   Heart:    Regular rate and rhythm, S1 and S2 normal, no murmur, rub   or gallop   Abdomen:     Soft, non-tender, bowel sounds active    Extremities:   Extremities normal, atraumatic, no cyanosis or edema   Psych:   Normal Affect   Neurologic:   CNII-XII intact  Normal strength b/ UE/LE/, sensation and reflexes Throughout     Lab, Imaging and other studies:      Results from last 7 days  Lab Units 06/22/18  0216 06/21/18  2107   WBC Thousand/uL  --  8 11   HEMOGLOBIN g/dL  --  10 5*   HEMATOCRIT %  --  33 8*   PLATELETS Thousands/uL 296 300   INR   --  0 97       Results from last 7 days  Lab Units 06/21/18  2107   SODIUM mmol/L 140   POTASSIUM mmol/L 3 6   CHLORIDE mmol/L 105   CO2 mmol/L 25   BUN mg/dL 16   CREATININE mg/dL 0 70   CALCIUM mg/dL 8 7   TOTAL PROTEIN g/dL 6 6   BILIRUBIN TOTAL mg/dL 0 30   ALK PHOS U/L 97   ALT U/L 28   AST U/L 24   GLUCOSE RANDOM mg/dL 100       Results from last 7 days  Lab Units 06/22/18  0222 06/21/18 2107   TROPONIN I ng/mL <0 02 <0 02     No results found for: Nikia Hernandez, [facility] , [facility], [place]    Scheduled Meds:    Current Facility-Administered Medications:  acetaminophen 650 mg Oral Q6H PRN Hilario Lefort, MD   cholecalciferol 5,000 Units Oral Daily Hilario Lefort, MD   docusate sodium 100 mg Oral BID Sarah Estrada PA-C   enoxaparin 40 mg Subcutaneous Daily Hilario Lefort, MD   ferrous sulfate 325 mg Oral BID With Meals Sarah Estrada PA-C   LORazepam 0 5 mg Intravenous 4x Daily PRN Sarah Estrada PA-C   nicotine 7 mg Transdermal Daily Hilario Lefort, MD   nitroglycerin 0 4 mg Sublingual Q5 Min PRN Hilario Lefort, MD       Continuous Infusions:       PRN Meds:    acetaminophen    LORazepam    nitroglycerin      Counseling / Coordination of Care  Total floor / unit time spent today 30 minutes  minutes   Greater than 50% of total time was spent with the patient and / or family counseling and / or coordination of care         This note has been constructed using a voice recognition system

## 2018-06-23 VITALS
OXYGEN SATURATION: 98 % | HEART RATE: 75 BPM | SYSTOLIC BLOOD PRESSURE: 105 MMHG | WEIGHT: 165.34 LBS | DIASTOLIC BLOOD PRESSURE: 62 MMHG | TEMPERATURE: 98.2 F | RESPIRATION RATE: 18 BRPM | HEIGHT: 65 IN | BODY MASS INDEX: 27.55 KG/M2

## 2018-06-23 PROBLEM — E04.1 CYSTIC THYROID NODULE: Status: ACTIVE | Noted: 2018-06-23

## 2018-06-23 PROBLEM — E06.3 HASHIMOTO'S THYROIDITIS: Status: ACTIVE | Noted: 2018-06-23

## 2018-06-23 PROBLEM — R55 SYNCOPE AND COLLAPSE: Status: ACTIVE | Noted: 2018-06-23

## 2018-06-23 LAB
ALBUMIN SERPL BCP-MCNC: 3.1 G/DL (ref 3.5–5)
ALP SERPL-CCNC: 97 U/L (ref 46–116)
ALT SERPL W P-5'-P-CCNC: 25 U/L (ref 12–78)
ANION GAP SERPL CALCULATED.3IONS-SCNC: 8 MMOL/L (ref 4–13)
AST SERPL W P-5'-P-CCNC: 21 U/L (ref 5–45)
ATRIAL RATE: 74 BPM
BASOPHILS # BLD AUTO: 0.04 THOUSANDS/ΜL (ref 0–0.1)
BASOPHILS NFR BLD AUTO: 1 % (ref 0–1)
BILIRUB SERPL-MCNC: 0.2 MG/DL (ref 0.2–1)
BUN SERPL-MCNC: 15 MG/DL (ref 5–25)
CALCIUM SERPL-MCNC: 9.4 MG/DL (ref 8.3–10.1)
CHLORIDE SERPL-SCNC: 106 MMOL/L (ref 100–108)
CO2 SERPL-SCNC: 26 MMOL/L (ref 21–32)
CREAT SERPL-MCNC: 0.74 MG/DL (ref 0.6–1.3)
EOSINOPHIL # BLD AUTO: 0.55 THOUSAND/ΜL (ref 0–0.61)
EOSINOPHIL NFR BLD AUTO: 9 % (ref 0–6)
ERYTHROCYTE [DISTWIDTH] IN BLOOD BY AUTOMATED COUNT: 17.5 % (ref 11.6–15.1)
GFR SERPL CREATININE-BSD FRML MDRD: 96 ML/MIN/1.73SQ M
GLUCOSE SERPL-MCNC: 77 MG/DL (ref 65–140)
HCT VFR BLD AUTO: 34.1 % (ref 34.8–46.1)
HGB BLD-MCNC: 10.5 G/DL (ref 11.5–15.4)
LYMPHOCYTES # BLD AUTO: 2.93 THOUSANDS/ΜL (ref 0.6–4.47)
LYMPHOCYTES NFR BLD AUTO: 45 % (ref 14–44)
MCH RBC QN AUTO: 22.9 PG (ref 26.8–34.3)
MCHC RBC AUTO-ENTMCNC: 30.8 G/DL (ref 31.4–37.4)
MCV RBC AUTO: 74 FL (ref 82–98)
MONOCYTES # BLD AUTO: 0.4 THOUSAND/ΜL (ref 0.17–1.22)
MONOCYTES NFR BLD AUTO: 6 % (ref 4–12)
NEUTROPHILS # BLD AUTO: 2.56 THOUSANDS/ΜL (ref 1.85–7.62)
NEUTS SEG NFR BLD AUTO: 40 % (ref 43–75)
P AXIS: 69 DEGREES
PLATELET # BLD AUTO: 296 THOUSANDS/UL (ref 149–390)
PMV BLD AUTO: 10.9 FL (ref 8.9–12.7)
POTASSIUM SERPL-SCNC: 4 MMOL/L (ref 3.5–5.3)
PR INTERVAL: 176 MS
PROT SERPL-MCNC: 6.4 G/DL (ref 6.4–8.2)
QRS AXIS: 40 DEGREES
QRSD INTERVAL: 106 MS
QT INTERVAL: 378 MS
QTC INTERVAL: 419 MS
RBC # BLD AUTO: 4.59 MILLION/UL (ref 3.81–5.12)
SODIUM SERPL-SCNC: 140 MMOL/L (ref 136–145)
T WAVE AXIS: 62 DEGREES
VENTRICULAR RATE: 74 BPM
WBC # BLD AUTO: 6.48 THOUSAND/UL (ref 4.31–10.16)

## 2018-06-23 PROCEDURE — 85025 COMPLETE CBC W/AUTO DIFF WBC: CPT | Performed by: PHYSICIAN ASSISTANT

## 2018-06-23 PROCEDURE — 99239 HOSP IP/OBS DSCHRG MGMT >30: CPT | Performed by: PHYSICIAN ASSISTANT

## 2018-06-23 PROCEDURE — 82306 VITAMIN D 25 HYDROXY: CPT | Performed by: PHYSICIAN ASSISTANT

## 2018-06-23 PROCEDURE — 93010 ELECTROCARDIOGRAM REPORT: CPT | Performed by: INTERNAL MEDICINE

## 2018-06-23 PROCEDURE — 80053 COMPREHEN METABOLIC PANEL: CPT | Performed by: PHYSICIAN ASSISTANT

## 2018-06-23 PROCEDURE — 99254 IP/OBS CNSLTJ NEW/EST MOD 60: CPT | Performed by: INTERNAL MEDICINE

## 2018-06-23 RX ORDER — FERROUS SULFATE 325(65) MG
325 TABLET ORAL 2 TIMES DAILY WITH MEALS
Qty: 60 TABLET | Refills: 0 | Status: SHIPPED | OUTPATIENT
Start: 2018-06-23 | End: 2019-10-09

## 2018-06-23 RX ADMIN — FERROUS SULFATE TAB 325 MG (65 MG ELEMENTAL FE) 325 MG: 325 (65 FE) TAB at 10:00

## 2018-06-23 RX ADMIN — DOCUSATE SODIUM 100 MG: 100 CAPSULE, LIQUID FILLED ORAL at 10:00

## 2018-06-23 RX ADMIN — CHOLECALCIFEROL TAB 25 MCG (1000 UNIT) 5000 UNITS: 25 TAB at 10:00

## 2018-06-23 NOTE — DISCHARGE SUMMARY
Discharge Summary - [facility]    Patient Information: Rafa Hayden 50 y o  female MRN: [id number]  Unit/Bed#: -01 Encounter: [id number]  Discharging Physician / Practitioner: Ynes Pope PA-C  PCP: Maximus Hutson DO  Admission Date:   Admission Orders     Ordered        06/22/18 1605  Inpatient Admission  Once         06/21/18 2204  Place in Observation (expected length of stay for this patient is less than two midnights)  Once             Discharge Date: 06/23/18    Reason for Admission:  Chest pain, dyspnea on exertion, syncopal event    Discharge Diagnoses:     Principal Problem:    Syncope and collapse  Active Problems:    Chest pain - Shortness of breath with exertion     Tobacco abuse    Microcytic anemia    History of gastric bypass    Cystic thyroid nodule    Hashimoto's thyroiditis  Resolved Problems:    * No resolved hospital problems  *      Consultations During Hospital Stay:  · Cardiology  · Endocrinology    Procedures Performed:     CTA of the head and neck: No acute intracranial disease  No large vessel flow restrictive disease in the head or neck  CXR: Normal examination        Significant Findings / Test Results:     · Syncopal event:  No evidence to suggest cardiac etiology  · Chest pain with dyspnea palpitations:  Recent negative stress test at [facility]  · Generalized fatigue  · Vitamin-D deficiency    Incidental Findings:   · None    Test Results Pending at Discharge (will require follow up): · None     Outpatient Tests Requested:  · A m  Cortisol  · Holter monitor  · Follow-up with PCP  · Thyroid study yearly given risk of developing hypothyroidism  · Repeat thyroid ultrasound 6 months for cystic thyroid nodule  · DEXA scan  · Endo follow up 4-6 weeks post discharge    Complications:  None    Hospital Course:     Rafa Hayden is a 50 y o  female patient who originally presented to the hospital on 6/21/2018 due to chest pain and shortness of breath  There is alternative information obtained from chart and ER note versus patient's 's description of events however he was not present prior to arrival to the hospital   The patient reportedly has had increasing fatigue and generalized malaise as an outpatient for the past 4-5 months  She also reports increasing dyspnea on exertion  She was being seen by her family doctor and cardiologist regarding this and intermittent palpitations  She had had a stress test performed in May of 2018 which was negative for ischemia  There was normal exercise capacity with normal hemodynamic response  She had normal left ventricular ejection fraction  She was noted to have been at home, running after her autistic year old granddaughter when she was noted to  her granddaughter, then the granddaughter got loose and began running towards the street  She once again then picked her up and continued running however at that point she was noted to fall towards the ground  The patient fell to her knees, put her granddaughter on the ground and then fell backwards  She was reportedly not fully arousable at that time  The ambulance was called however police arrived within 5 min in the ambulance arrived 17 min later  The patient was given Ativan in EMS and brought to the emergency department  The patient's  reports that she was not breathing during this event but he was not present and there is no mention of this in ER reports  According to ER reports, the patient had been given Ativan in EMS route given concern for underlying anxiety and panic attack  When the patient arrived in the ER, she was reportedly more lethargic according to her  than baseline  She complained of chest pain  She was noted to have D-dimer performed which was negative  She was noted to have chest x-ray performed and this was also negative  She was noted to be subsequently admitted, seen in consultation by Cardiology    Who she was noted to have sinus bradycardia overnight while sleeping however was not noted to have any arrhythmias throughout her stay  She is recommended for outpatient Holter monitor  The patient does reports he has been having increasing anxiety at home in regards to increasing stressful situations however does not feel that this is much more stressful then her levels for the past year  She did have a thyroid nodule discovered previously for which she had thyroid antibodies performed which showed abnormalities  Therefore she was seen in consultation by Endocrinology at which point they felt she had underlying Hashimoto's thyroiditis but with normal thyroid function studies there was no indication for thyroid replacement  She is recommended to have an a m  cortisol as an outpatient for her underlying fatigue  She is also recommended to have an outpatient Holter monitor  She had a CTA of the head and neck which showed no significant flow restrictive disease and no acute intra cranial abnormality  She had no tongue biting, loss of bowel or bladder or seizure-like activity to suggest this is etiology  She had been monitored during her hospital stay from 6/21/2018 in till 6/23/2018 and did not have any further events  She was deemed stable for outpatient discharge with continued follow-up as an outpatient with her PC P  She is recommended to obtain Holter monitor which has been ordered by our cardiologist, follow up as an outpatient with her primary cardiologist and on go continued workup for underlying fatigue and malaise as an outpatient  Condition at Discharge: stable     Discharge Day Visit / Exam:     Subjective:  Feeling at her baseline for the past 4-5 months  Does not have any dizziness  No nausea or vomiting    No chest pain or shortness of breath at rest   Vitals: Blood Pressure: 105/62 (06/23/18 0827)  Pulse: 75 (06/23/18 0827)  Temperature: 98 2 °F (36 8 °C) (06/23/18 0827)  Temp Source: Oral (06/23/18 0827)  Respirations: 18 (06/23/18 0827)  Height: 5' 5" (165 1 cm) (06/22/18 8491)  Weight - Scale: 75 kg (165 lb 5 5 oz) (06/21/18 1928)  SpO2: 98 % (06/23/18 0827)    Exam:   Physical Exam   Constitutional: No distress  HENT:   Head: Normocephalic and atraumatic  Eyes: Conjunctivae are normal    Neck: No JVD present  Cardiovascular: Normal rate, regular rhythm, normal heart sounds and intact distal pulses  No murmur heard  Pulmonary/Chest: Effort normal  No respiratory distress  She has no wheezes  She has no rales  Abdominal: Soft  Bowel sounds are normal  She exhibits no distension  There is no tenderness  There is no rebound and no guarding  Musculoskeletal: She exhibits no edema  Neurological: She is alert  AAO x 3  Follows commands  CN 2-12  Speech clear and without dysarthria  EOMs intact without nystagmus, strabismus or lid lag  MS 5/5 in all four extremities    Skin: No rash noted  She is not diaphoretic  No erythema  Nursing note and vitals reviewed  Discussion with Family:     Discharge instructions/Information to patient and family:   See after visit summary for information provided to patient and family  Provisions for Follow-Up Care:  See after visit summary for information related to follow-up care and any pertinent home health orders  Disposition:     Home    For Discharges to [facility] SNF:   · Not Applicable to this Patient - Not Applicable to this Patient    Planned Readmission: no     Discharge Statement:  I spent 45 minutes discharging the patient  This time was spent on the day of discharge  I had direct contact with the patient on the day of discharge  Greater than 50% of the total time was spent examining patient, answering all patient questions, arranging and discussing plan of care with patient as well as directly providing post-discharge instructions  Additional time then spent on discharge activities      Discharge Medications:  See after visit summary for reconciled discharge medications provided to patient and family        ** Please Note: This note has been constructed using a voice recognition system **

## 2018-06-23 NOTE — CONSULTS
Consultation - Loan Xiong 50 y o  female MRN: [id number]    Unit/Bed#: -01 Encounter: [id number]      Assessment/Plan     Assessment: This is a 50y o -year-old female with Hashimoto thyroiditis, thyroid cystic nodule, vitamin-D deficiency, iron deficiency anemia and post gastrectomy malabsorption    Plan:  Thyroid function test within normal range-no indication for thyroid hormone replacement-she will need monitoring of her thyroid function test at least on a yearly basis or if her symptoms changes -she is at risk for developing hypothyroidism  Cystic thyroid nodule-does not meet criteria for fine-needle aspiration biopsy-suggest repeat thyroid ultrasound in about 6 months    Vitamin-D deficiency-recently started on supplementations-vitamin D3 5000 international units daily  Counseled on the importance of taking supplementations on a regular basis  Given history of bariatric surgery, noncompliance with supplementations, vitamin-D deficiency, early surgical menopause she is at risk for bone loss and osteoporosis  She should have a DEXA scan as outpatient    Iron deficiency anemia-his started on iron supplementations as per primary team    Would suggest checking an a m  Cortisol for workup for fatigue-if she is being discharged home today this can be checked as outpatient    Outpatient follow-up with Endocrinology 4-6 weeks after discharge    CC: Diabetes Consult    History of Present Illness     HPI: Loan Xiong is a 50y o  year old female who presented with complaints of shortness of breath and palpitations and possible syncopal episode-she recently has had out thyroid antibodies done which were high-endocrine consult is requested for thyroid dysfunction  She had a recent ultrasound done which showed a thyroid cyst as well-denies any difficulty swallowing, complains of shortness of breath on exertion for the past few months  Complains of fatigue, no recent changes in her weight  History of bariatric surgery about 10 years back and had lost about 150 lb after that  Weight has been stable for the past few years  She is not taking her supplementations on a regular basis  She complains of chronic constipation, dose of palpitations, anxiety, sleep disturbances, fatigue heat intolerance and excessive sweating  She underwent total hysterectomy in 2001-was on estrogen for a few months after that  Consults    Review of Systems   Constitutional: Positive for fatigue  Negative for fever and unexpected weight change  Eyes: Negative for visual disturbance  Respiratory: Positive for shortness of breath  Negative for cough  Cardiovascular: Positive for palpitations  Negative for chest pain and leg swelling  Gastrointestinal: Positive for constipation  Negative for diarrhea, nausea and vomiting  Musculoskeletal: Negative for gait problem  Skin: Negative for pallor  Psychiatric/Behavioral: Positive for sleep disturbance  The patient is nervous/anxious  All other systems reviewed and are negative  Historical Information   Past Medical History:   Diagnosis Date    Anxiety     Disease of thyroid gland      Past Surgical History:   Procedure Laterality Date    GASTRIC BYPASS      HYSTERECTOMY       Social History   History   Alcohol Use    Yes     Comment: occassional     History   Drug Use No     History   Smoking Status    Current Every Day Smoker   Smokeless Tobacco    Never Used     Family History:   Mother has Graves disease    Meds/Allergies   Current Facility-Administered Medications   Medication Dose Route Frequency Provider Last Rate Last Dose    acetaminophen (TYLENOL) tablet 650 mg  650 mg Oral Q6H PRN Héctor Carney MD        albuterol (PROVENTIL HFA,VENTOLIN HFA) inhaler 2 puff  2 puff Inhalation Q4H PRN Sarah Estrada PA-C        cholecalciferol (VITAMIN D3) tablet 5,000 Units  5,000 Units Oral Daily Héctor Carney MD   5,000 Units at 06/22/18 0845    docusate sodium (COLACE) capsule 100 mg  100 mg Oral BID Sarah SCOT Estrada   100 mg at 06/22/18 1741    enoxaparin (LOVENOX) subcutaneous injection 40 mg  40 mg Subcutaneous Daily Sylwia Garcia MD        ferrous sulfate tablet 325 mg  325 mg Oral BID With Meals [facility], SCOT   325 mg at 06/22/18 1741    LORazepam (ATIVAN) 2 mg/mL injection 0 5 mg  0 5 mg Intravenous 4x Daily PRN Sarah Estrada PA-C        nicotine (NICODERM CQ) 7 mg/24hr TD 24 hr patch 7 mg  7 mg Transdermal Daily Sylwia Garcia MD        nitroglycerin (NITROSTAT) SL tablet 0 4 mg  0 4 mg Sublingual Q5 Min PRN Sylwia Garcia MD         Allergies   Allergen Reactions    Tetracyclines & Related Anaphylaxis    Percocet [Oxycodone-Acetaminophen] GI Intolerance       Objective   Vitals: Blood pressure 105/62, pulse 75, temperature 98 2 °F (36 8 °C), temperature source Oral, resp  rate 18, height 5' 5" (1 651 m), weight 75 kg (165 lb 5 5 oz), SpO2 98 %  Intake/Output Summary (Last 24 hours) at 06/23/18 1128  Last data filed at 06/23/18 0930   Gross per 24 hour   Intake              360 ml   Output                0 ml   Net              360 ml     Invasive Devices     Peripheral Intravenous Line            Peripheral IV 06/21/18 Left Antecubital 1 day                Physical Exam   Constitutional: She is oriented to person, place, and time  She appears well-developed and well-nourished  No distress  HENT:   Head: Normocephalic and atraumatic  Mouth/Throat: No oropharyngeal exudate  Eyes: Conjunctivae and EOM are normal  No scleral icterus  Neck: Normal range of motion  Neck supple  No thyromegaly present  Cardiovascular: Normal rate, regular rhythm and normal heart sounds  No murmur heard  Pulmonary/Chest: Effort normal and breath sounds normal  No respiratory distress  She has no wheezes  She has no rales  Abdominal: Soft  Bowel sounds are normal  She exhibits no distension  There is no tenderness  There is no rebound  Musculoskeletal: Normal range of motion  She exhibits no edema or deformity  Lymphadenopathy:     She has no cervical adenopathy  Neurological: She is alert and oriented to person, place, and time  Skin: Skin is warm and dry  No rash noted  No erythema  No pallor  Psychiatric: She has a normal mood and affect  Her behavior is normal  Judgment and thought content normal        The history was obtained from the review of the chart, patient and family  Lab Results:       Lab Results   Component Value Date    WBC 6 48 06/23/2018    HGB 10 5 (L) 06/23/2018    HCT 34 1 (L) 06/23/2018    MCV 74 (L) 06/23/2018     06/23/2018     Lab Results   Component Value Date/Time    BUN 15 06/23/2018 06:43 AM    BUN 14 08/26/2015 08:52 AM     06/23/2018 06:43 AM     08/26/2015 08:52 AM    K 4 0 06/23/2018 06:43 AM    K 4 5 08/26/2015 08:52 AM     06/23/2018 06:43 AM     08/26/2015 08:52 AM    CO2 26 06/23/2018 06:43 AM    CO2 28 08/26/2015 08:52 AM    CREATININE 0 74 06/23/2018 06:43 AM    CREATININE 0 87 08/26/2015 08:52 AM    AST 21 06/23/2018 06:43 AM    AST 25 08/26/2015 08:52 AM    ALT 25 06/23/2018 06:43 AM    ALT 27 08/26/2015 08:52 AM    ALB 3 1 (L) 06/23/2018 06:43 AM    ALB 3 4 (L) 08/26/2015 08:52 AM    June 1, 2018-vitamin-D 25 hydroxy 25  TPO antibody 82, thyroglobulin antibody more than 3000 , TSH 1 79  June 22nd-TSH 1 5,       Imaging Studies: I have personally reviewed pertinent reports  THYROID ULTRASOUND     INDICATION:    Night sweats, chest palpitations      COMPARISON:  1/6/2009     TECHNIQUE:   Ultrasound of the thyroid was performed with a high frequency linear transducer in transverse and sagittal planes including volumetric imaging sweeps as well as traditional still imaging technique      FINDINGS:  Normal homogeneous smooth echotexture      Right lobe:  1 9 x 1 7 x 5 8 cm  Left lobe:  1 7 x 1 6 x 5 4 cm    Isthmus:  0 2 cm      Nodule #1  LEFT upper pole nodule measuring 0 5 x 0 6 x 1 7 cm  Not present previously  This is posterior in location though likely within the thyroid as there appears to be normal thyroid tissue surrounding the lesion  COMPOSITION:  0 points, cystic or almost completely cystic  ECHOGENICITY:  0 points, anechoic  SHAPE:  0 points, wider-than-tall  MARGIN: 0 points, smooth  ECHOGENIC FOCI:  0 points, none or large comet-tail artifacts  TI-RADS Classification: TR 1 (0 points), Benign  No FNA      IMPRESSION:     New left upper pole nodule measuring 1 7 cm  This appears cystic or almost entirely cystic  There are no solid components or internal vascularity  This does not meet ultrasound criteria for fine-needle aspiration or follow-up unless otherwise   clinically indicated  Portions of the record may have been created with voice recognition software
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
## 2018-06-25 ENCOUNTER — HOSPITAL ENCOUNTER (OUTPATIENT)
Dept: NON INVASIVE DIAGNOSTICS | Facility: HOSPITAL | Age: 48
Discharge: HOME | End: 2018-06-25
Attending: INTERNAL MEDICINE
Payer: COMMERCIAL

## 2018-06-25 DIAGNOSIS — R07.89 OTHER CHEST PAIN: ICD-10-CM

## 2018-06-25 DIAGNOSIS — R00.2 PALPITATIONS: ICD-10-CM

## 2018-06-25 PROCEDURE — 93225 XTRNL ECG REC<48 HRS REC: CPT

## 2018-06-25 PROCEDURE — 93226 XTRNL ECG REC<48 HR SCAN A/R: CPT

## 2018-06-25 NOTE — CASE MANAGEMENT
Notification of Inpatient Admission/Inpatient Authorization Request  This is a Notification of Inpatient Admission/Request for Inpatient Authorization to our facility [facility]  Please be advised that this patient is currently in our facility under Inpatient Status  Below you will find the Attending Physician and Facilitys information including NPI# and contact information for the Utilization  assigned to the [facility] where the patient is receiving services  Please feel free to contact the Utilization Review Department with any questions  Patient Information:  PATIENT NAME: Taylor Dempsey  MRN: [id number]  YOB: 1970    PRESENTATION DATE: 6/21/2018  7:20 PM  IP ADMISSION DATE: 6/22/18 1605  DISCHARGE DATE: 6/23/2018  4:17 PM   DISPOSITION: Home/Self Care    Attending Physician:  TOMÁS Walton  Specialty- Hospitalist,   [facility] ID- [id number]  [address] [place] [address], [address] [place] [address]  Phone 1: [phone]  Fax: [phone]  Facility:  [facility]  Address: [address] [place] [address] [facility] [address] [place] [address]    Phone: [phone]  Tax ID [id number]  NPI [id number]   Medicare: [id number]    [facility] in the [facility] by C[facility] Parks for 2017  Network Utilization Review Department  Phone: [phone]; Fax [phone]  ATTENTION: The Network Utilization Review Department is now centralized for our 7 Facilities  Make a note that we have a new phone and fax numbers for our Department  Please call with any questions or concerns to [phone] and carefully follow the prompts so that you are directed to the right person  All voicemails are confidential  Fax any determinations, approvals, denials, and requests for initial or continue stay review clinical to [phone]   Due to HIGH CALL volume, it would be easier if you could please send faxed requests to expedite your requests and in part, help us provide discharge notifications faster  Marco Antonio Gold RN Registered Nurse Signed   Case Management Date of Service: 6/23/2018  2:01 PM         []Hide copied text  Initial Clinical Review     Admission: Date/Time/Statement: 6/21/2018  2203 OBSERVATION and CHANGED  6/22/18 @ 1605 to INPATIENT re persistent lightheadedness             Orders Placed This Encounter   Procedures    Inpatient Admission       Standing Status:   Standing       Number of Occurrences:   1       Order Specific Question:   Admitting Physician       Answer:   Luz Marina Matters [[id number]]       Order Specific Question:   Level of Care       Answer:   Med Surg [16]       Order Specific Question:   Estimated length of stay       Answer:   More than 2 Midnights       Order Specific Question:   Certification       Answer:   I certify that inpatient services are medically necessary for this patient for a duration of greater than two midnights  See H&P and MD Progress Notes for additional information about the patient's course of treatment          ED: Date/Time/Mode of Arrival:             ED Arrival Information      Expected Arrival Acuity Means of Arrival Escorted By Service Admission Type     - 6/21/2018 19:20 Urgent Ambulance [facility] General Medicine Urgent     Arrival Complaint     Anxiety Attack             Chief Complaint:        Chief Complaint   Patient presents with    Chest Pain       Running after grandson, not able to breath  Greenberg Martinet to the ground  Family reports she was gasping for air and did not respond for about 10 minutes  Patient reports chest discomfort that radiates down the left arm  Has recently been having cp and a stress test about 3 weeks ago           History of Illness: 50 y  o  female who presents with complaints of shortness of breath with chest discomfort as she was playing with her grandson earlier today   She notes that she has been having on and off shortness of breath with palpitations and occasional night sweats over the last few months   Notably, she underwent a stress echocardiogram at [facility] last month that was negative for acute ischemia or notable structural heart disease   Earlier today, she noted getting into a peaceful altercation with her daughter's landlord who was threatening to evict her daughter  Twyla Louise her  at bedside, she is not too good at expressing her motions and was holding in anger the entire time  Pete Riddle denies any recent other stress in her life over the last few months but states that she occasionally has these palpitations and her heart with occasional arm tremors   Earlier this month a thyroid panel that was checked was unremarkable although thyroid peroxidase antibody and thyroglobulin antibody levels were elevated which prompted a thyroid ultrasound which was negative for acute findings other than an isolated cystic nodule   She also underwent gastric bypass surgery approximately 10-12 years ago, and although instructed to maintain a regimen of vitamin/mineral supplementation, she was only compliant with this for a month afterwards   She currently only takes a vitamin-D tablet   Bloodwork at [facility] a month ago did reveal microcytic anemia which was confirmed on blood work today in our ER  Pete Riddle cannot recall if she was ever diagnosed with iron deficiency anemia although she does complain of worsening fatigue in general even without exertion over the last few months       On 6/22/2018-  Patient has extreme fatigue, ambulating, very short of breath and lightheaded        ED Vital Signs:            ED Triage Vitals [06/21/18 1927]   Temperature Pulse Respirations Blood Pressure SpO2   98 8 °F (37 1 °C) 80 20 136/79 94 %       Temp Source Heart Rate Source Patient Position - Orthostatic VS BP Location FiO2 (%)   Oral Monitor Lying Right arm --       Pain Score           No Pain            Wt Readings from Last 1 Encounters: 06/21/18 75 kg (165 lb 5 5 oz)         06/22/18 1500   98 5 °F (36 9 °C)   59   18   104/57   --   96 %   None (Room air)   Lying         Vital Signs (abnormal): none     Abnormal Labs/Diagnostic Test Results: Albumin 3 2  Wbc 8 11, hgb 10 5, hct 33  8       Serial troponin negative     6/23/2018-  Albumin 3 1  Wbc 6 48, hgb 10 5, hct 34  1       cta head and neck - No acute intracranial disease  No large vessel flow restrictive disease in the head or neck     ED Treatment:              Medication Administration from 06/21/2018 1920 to 06/21/2018 2300        Date/Time Order Dose Route Action Comments       06/21/2018 2208 sodium chloride 0 9 % bolus 1,000 mL 0 mL Intravenous Stopped         06/21/2018 2108 sodium chloride 0 9 % bolus 1,000 mL 1,000 mL Intravenous New Bag               Past Medical/Surgical History:         Active Ambulatory Problems     Diagnosis Date Noted    No Active Ambulatory Problems           Resolved Ambulatory Problems     Diagnosis Date Noted    No Resolved Ambulatory Problems           Past Medical History:   Diagnosis Date    Anxiety      Disease of thyroid gland           Admitting Diagnosis: Chest pain [R07 9]  Dyspnea on exertion [R06 09]  Chest pain, unspecified type [R07 9]     Age/Sex: 50 y o  female     Assessment/Plan:       Chest pain - Shortness of breath with exertion    Assessment & Plan     - recent stress echocardiogram at [facility] on 5/11/18 was unremarkable for acute ischemia or significant structural disease per report - patient notes episodic symptoms of shortness of breath and anxiety/palpitations induced by stress/emotional triggers as she had a somewhat peaceful altercation with her daughter's landlord who was threatening to evict her and shortly afterwards while playing with her grandson had sudden shortness of breath with exertion and physical activity  - symptoms nearly spontaneously resolved and she currently denies any chest pain or shortness of breath  - no family history of blood clots noted - will check D-dimer and if abnormal, can possibly consider imaging to rule out pulmonary embolism though seems clinically less likely   - TSH and free T4 level earlier this month were within normal limits although thyroid peroxidase Ab and thyroglobulin Ab were elevated (at [facility]) - a subsequent thyroid ultrasound here at [facility][note 3] revealed a 1 7 cm left upper lobe nodule deemed cystic in nature hence not requiring further evaluation  - patient also reports cessation of caffeinated beverages recently  - monitor overnight on telemetry   - microcytic anemia noted (see plan below)        Tobacco abuse   Assessment & Plan     - counseled on cessation - transdermal nicotine patch ordered during hospital course       Microcytic anemia   Assessment & Plan     - hemoglobin of 11 2 last month at [facility] the dropped to 10 5 today (similar MCV in the mid 70s compared to last month)   - check iron panel with reticulocyte count and initiate supplementation if deficiency noted - h/o gastric bypass w/ failure to continue vitamin/mineral supplementation noted (see below)        History of gastric bypass   Assessment & Plan     - approximately 10-12 years ago - took multivitamins for a few months afterwards at that time but has not taken any further supplementation since   - does complain of intermittent extremity numbness/weakness especially over the last few months - check iron panel and folate/B12 levels             Admission Orders: 6/21/2018   2203 OBSERVATION AND CHANGED TO INPATIENT 6/22/2018  1602  Scheduled Meds:   Current Facility-Administered Medications:  cholecalciferol 5,000 Units Oral Daily   docusate sodium 100 mg Oral BID   enoxaparin 40 mg Subcutaneous Daily   ferrous sulfate 325 mg Oral BID With Meals   nicotine 7 mg Transdermal Daily      Continuous Infusions:    PRN Meds: not used     scds  Consult endocrine; cardiology     Per cardiology -  patient presents with easy fatigue/SOB/CP/palpitations  Recent stress echo in 5/18 was normal without any signs of inducible ischemia, and her exercise tolerance was actually pretty good considering her reported symptoms  No suggestion for cardiac etiology for her symptoms given her labs, telemetry (showing only SR, no arrhythmias or ectopy), and recent stress testing  Would just recommend 48 hour Holter monitor after discharge to evaluate for any ectopy when she is in her usual environment     Per endocrine - This is a 50y o -year-old female with Hashimoto thyroiditis, thyroid cystic nodule, vitamin-D deficiency, iron deficiency anemia and post gastrectomy malabsorption     Plan:  Thyroid function test within normal range-no indication for thyroid hormone replacement-she will need monitoring of her thyroid function test at least on a yearly basis or if her symptoms changes -she is at risk for developing hypothyroidism         Thank you,  [facility] in the [facility] by [facility] Parks for 2017  Network Utilization Review Department  Phone: [phone]; Fax [phone]  ATTENTION: The Network Utilization Review Department is now centralized for our 7 Facilities  Make a note that we have a new phone and fax numbers for our Department  Please call with any questions or concerns to [phone] and carefully follow the prompts so that you are directed to the right person  All voicemails are confidential  Fax any determinations, approvals, denials, and requests for initial or continue stay review clinical to [phone]  Due to HIGH CALL volume, it would be easier if you could please send faxed requests to expedite your requests and in part, help us provide discharge notifications faster  Notification of Discharge  This is a Notification of Discharge from our facility [address]   Please be advised that this patient has been discharge from our facility  Below you will find the admission and discharge date and time including the patients disposition  PRESENTATION DATE: 6/21/2018  7:20 PM  IP ADMISSION DATE: 6/22/18 1605  DISCHARGE DATE: 6/23/2018  4:17 PM  DISPOSITION: [facility] in the [facility] by [facility] Parks for 2017  Network Utilization Review Department  Phone: [phone]; Fax [phone]  ATTENTION: The Network Utilization Review Department is now centralized for our 7 Facilities  Make a note that we have a new phone and fax numbers for our Department  Please call with any questions or concerns to [phone] and carefully follow the prompts so that you are directed to the right person  All voicemails are confidential  Fax any determinations, approvals, denials, and requests for initial or continue stay review clinical to [phone]  Due to HIGH CALL volume, it would be easier if you could please send faxed requests to expedite your requests and in part, help us provide discharge notifications faster

## 2018-06-27 LAB
25(OH)D2 SERPL-MCNC: <1 NG/ML
25(OH)D3 SERPL-MCNC: 36 NG/ML
25(OH)D3+25(OH)D2 SERPL-MCNC: 36 NG/ML

## 2018-06-29 PROCEDURE — 93227 XTRNL ECG REC<48 HR R&I: CPT | Performed by: INTERNAL MEDICINE

## 2019-09-11 ENCOUNTER — TRANSCRIBE ORDERS (OUTPATIENT)
Dept: ADMINISTRATIVE | Facility: HOSPITAL | Age: 49
End: 2019-09-11

## 2019-09-11 DIAGNOSIS — E06.3 CHRONIC LYMPHOCYTIC THYROIDITIS: Primary | ICD-10-CM

## 2019-09-12 ENCOUNTER — HOSPITAL ENCOUNTER (OUTPATIENT)
Dept: RADIOLOGY | Age: 49
Discharge: HOME | End: 2019-09-12
Payer: COMMERCIAL

## 2019-09-12 DIAGNOSIS — E06.3 CHRONIC LYMPHOCYTIC THYROIDITIS: ICD-10-CM

## 2019-09-12 PROCEDURE — 76536 US EXAM OF HEAD AND NECK: CPT

## 2019-09-24 NOTE — CONSULTS
"    Physical Therapy Daily Treatment Note     Name: Lisa Dave [facility]  Clinic Number: [id number]    Therapy Diagnosis:   No diagnosis found.  Physician: Rip Guy MD    Visit Date: 9/24/2019  Physician Orders: PT Eval and Treat   Medical Diagnosis from Referral: M21.41 (ICD-10-CM) - Acquired pes planovalgus of right foot  Evaluation Date: 8/29/2019  Authorization Period Expiration: 12/31/2019  Plan of Care Expiration: 10/9/19  Visit # / Visits authorized: 8/ 20    Time In: 1400  Time Out: 1500  Total Billable Time: 45 minutes    Precautions: Standard    Subjective     Pt reports: rash is improved, applying hydrocortisone cream. Improving strength and balance.  She was compliant with home exercise program.  Response to previous treatment: no adverse effect  Functional change: progressing ROM and gt    Pain: 3/10 pre tx and 0/10 post tx  Location: right feet      Objective     Lisa received therapeutic exercises to develop strength, ROM and flexibility for 27 minutes including:    Stationary bike with high seat 8' for ROM of R ankle-np to look at wound healing  Calf stretch 2 ways 2'2'    DL HR 2x10 with HHA  Ankle 4 way ytb  Unable to perform heel tap due to limited ankle DF  Step ups 6" 2x10    Neuromuscular ex x 10 min:  R SLS with HHA 3x10", difficult  Tandem stance on foam 3x30" R/L  Tandem stance with rebounder toss 2x10 each    Gait x0 min:  Pre gait step with heel strike and WS over foam x15    Wound inspection-healing, some scab removed that was loose-5    Lisa received the following manual therapy techniques: Joint mobilizations and Manual traction were applied to the: L foot and ankle for 08 minutes, including:  Gentle talar distraction  Gd I-III talar med and lat glides    Cold pack x00 to R foot and ankle.    Home Exercises Provided and Patient Education Provided     Education provided:   - HEP  - let remaining scab fall off, cont hydrocortisone cream as directed by MD    Written Home " Consultation - Cardiology   Jany Kaba 50 y o  female MRN: [id number]  Unit/Bed#: -01 Encounter: [id number]    Assessment/Plan     Principal Problem:    Chest pain - Shortness of breath with exertion   Active Problems:    Tobacco abuse    Microcytic anemia    History of gastric bypass      Assessment/Plan    1  CP/SOB/palpitations  Pt has both exertional and non exertional CP/SOB  Recent stress echocardiogram no evidence of ischemia to suggest occlusive CAD  Normal LVEF  D dimer low  Troponin normal   If telemetry continues to be unrevealing can proceed with 48 hour holter to assure to arrythmia contributing to symptoms  Symptoms are daily  Recent TFT's normal although requesting repeat  She is mildly anemic- microcytic  Will defer to SLIM  Recommend significantly decreasing caffeine intake which was suggested by prior cardiologist  Recommend smoking cessation      History of Present Illness   Physician Requesting Consult: Gio De León MD  Reason for Consult / Principal Problem: CP,SOB    HPI: Jany Kaba is a 50y o  year old female with no cardiac history who was admitted with chest pain and shortness of breath  She apparently was running after her grandson yesterday and began feeling extreme fatigue  She had chest tightness and she felt short of breath  She also had palpitations  At one point she was seen falling to the ground by her daughter  Through another family member at the bedside the daughter had mentioned it looked like the patient was putting the 3year-old down and ended up falling backward  Patient is not sure if she had loss of consciousness but the daughter once again reporting through a family member at the bedside said the patient did not respond to her for about 10 minutes  She is not sure if she was breathing at that time  Patient recalls awakening on the ground and feeling like she could move her arms and legs  She then can recall seeing EMS     Per ED reports there is "Exercises Provided: Patient instructed to cont prior HEP.  Exercises were reviewed and Lisa was able to demonstrate them prior to the end of the session.  Lisa demonstrated good  understanding of the education provided.     See EMR under Media for exercises provided 9/4/2019.    Assessment     Lisa continues ambulating without AD or boot. Rash is improving. Strength and balance improving. R SLS to 10". Tandem progressed to foam. She continues with R ankle stiffness and achilles tightness. Cont to address deficits for improved gt and stair navigation.  Pt prognosis is Good.     Pt will continue to benefit from skilled outpatient physical therapy to address the deficits listed in the problem list box on initial evaluation, provide pt/family education and to maximize pt's level of independence in the home and community environment.     Pt's spiritual, cultural and educational needs considered and pt agreeable to plan of care and goals.    Anticipated barriers to physical therapy: none    Goals:   Short Term Goals: 3 weeks progressing to all  -Pt will demo R ankle AROM DF to -5 deg or better for improved heel strike once out of boot. MET 9/19/19, -5 DEG.  -Pt will demo R ankle AROM PF to 50 deg to assist with heel raise. MET 9/19/19, 55 DEG.  -Pt will walk without boot as advised by MD. MET 9/19/19.     Long Term Goals: 6 weeks progressing to all  -Improve FOTO impairment score to 43% for improved QOL.  -R ankle AROM and strength WFL for return to PLOF.    Plan     Cont current plan for ROM, balance, strength, and gt progression.    Dolly Benítez, PT  " a question of some anxiety in the ambulance  Ativan was given  Patient denies having anxiety  She does report however feeling a bit stressed about her social situation  She takes care of several small grandchildren and 1 is autistic  For several months she has been exhausted  She reports the chest pain and shortness of breath both with activity and without activity  She has a difficult time just walking up a flight of stairs due to shortness of breath  She also reports frequent palpitations  In the ED she had chest discomfort with radiation down the left arm  Initial vital signs blood pressure 136/79 with a heart rate of 80 and a pulse ox of 94% on room air  She had seen [facility] several months ago for similar symptoms  She underwent a stress echocardiogram   She walked 9 minutes on the treadmill  Her stress echocardiogram did not show any significant arrhythmia or any evidence of ischemia  EF normal  She recalls being exhausted on the treadmill  She also reports frequent episodes of diaphoresis which is been going on for couple of months  She does drink quite a bit of caffeine despite being asked by cardiologist to decrease her intake  She is a smoker  She is being worked up for a thyroid cyst   She reports to me that a recent TSH free T4 and T3 of been normal  She has a history gastric bypass surgery as well as hysterectomy in 2001 for polycystic ovarian disease and has not been on hormone replacement therapy since  Troponin negative  She is mildly microcytic anemic  She is iron deficient anemia  Borderline low potassium  D-dimer was 296  Inpatient consult to Cardiology  Consult performed by: Ernesto Sweet ordered by: Kan Ojeda          Review of Systems   Constitutional: Positive for activity change, diaphoresis and fatigue  HENT: Negative  Eyes: Negative  Respiratory: Positive for shortness of breath      Cardiovascular: Positive for chest pain  Gastrointestinal: Negative  Musculoskeletal: Negative  Skin: Negative  Neurological: Positive for numbness  Hematological: Negative  Psychiatric/Behavioral: Negative  Historical Information   Past Medical History:   Diagnosis Date    Anxiety     Disease of thyroid gland      Past Surgical History:   Procedure Laterality Date    GASTRIC BYPASS      HYSTERECTOMY       History   Alcohol Use    Yes     Comment: occassional     History   Drug Use No     History   Smoking Status    Current Every Day Smoker   Smokeless Tobacco    Never Used     Family History: History reviewed  No pertinent family history  Meds/Allergies   current meds:   Current Facility-Administered Medications   Medication Dose Route Frequency    acetaminophen (TYLENOL) tablet 650 mg  650 mg Oral Q6H PRN    cholecalciferol (VITAMIN D3) tablet 5,000 Units  5,000 Units Oral Daily    enoxaparin (LOVENOX) subcutaneous injection 40 mg  40 mg Subcutaneous Daily    nicotine (NICODERM CQ) 7 mg/24hr TD 24 hr patch 7 mg  7 mg Transdermal Daily    nitroglycerin (NITROSTAT) SL tablet 0 4 mg  0 4 mg Sublingual Q5 Min PRN    and PTA meds:  Prescriptions Prior to Admission   Medication    cholecalciferol (VITAMIN D3) 1,000 units tablet     Allergies   Allergen Reactions    Tetracyclines & Related Anaphylaxis    Percocet [Oxycodone-Acetaminophen] GI Intolerance       Objective   Vitals: Blood pressure 115/68, pulse 61, temperature 97 9 °F (36 6 °C), temperature source Oral, resp  rate 18, height 5' 5" (1 651 m), weight 75 kg (165 lb 5 5 oz), SpO2 98 %    Orthostatic Blood Pressures      Most Recent Value   Blood Pressure  115/68 filed at 06/22/2018 9324   Patient Position - Orthostatic VS  Lying filed at 06/22/2018 7353            Intake/Output Summary (Last 24 hours) at 06/22/18 1142  Last data filed at 06/21/18 2208   Gross per 24 hour   Intake             1000 ml   Output                0 ml   Net             1000 ml       Invasive Devices     Peripheral Intravenous Line            Peripheral IV 06/21/18 Left Antecubital less than 1 day                Physical Exam: /68 (BP Location: Left arm)   Pulse 61   Temp 97 9 °F (36 6 °C) (Oral)   Resp 18   Ht 5' 5" (1 651 m)   Wt 75 kg (165 lb 5 5 oz)   SpO2 98%   BMI 27 51 kg/m²      General Appearance:    Alert, cooperative, no distress, appears stated age   Head:    Normocephalic, no scleral icterus   Eyes:    PERRL   Nose:   Nares normal, septum midline, mucosa normal, no drainage    Throat:   Lips, mucosa, and tongue normal   Neck:   Supple, symmetrical, trachea midline     no  JVD   Back:     Symmetric   Lungs:     Clear to auscultation bilaterally, respirations unlabored   Chest Wall:    No tenderness or deformity    Heart:    Regular rate and rhythm, S1 and S2 normal, no murmur, rub   or gallop   Abdomen:     Soft, non-tender, bowel sounds active all four quadrants   Extremities:   Extremities normal, atraumatic, no cyanosis or edema   Pulses:   2+ and symmetric all extremities   Skin:   Skin color, texture, turgor normal, no rashes or lesions   Neurologic:   Alert and oriented to person place and time   No focal deficits       Lab Results:   Recent Results (from the past 72 hour(s))   ED Urine Macroscopic    Collection Time: 06/21/18  8:43 PM   Result Value Ref Range    Color, UA Yellow     Clarity, UA Clear     pH, UA 7 0 4 5 - 8 0    Leukocytes, UA Trace (A) Negative    Nitrite, UA Negative Negative    Protein, UA Negative Negative mg/dl    Glucose, UA Negative Negative mg/dl    Ketones, UA Negative Negative mg/dl    Urobilinogen, UA 0 2 0 2, 1 0 E U /dl E U /dl    Bilirubin, UA Negative Negative    Blood, UA Negative Negative    Specific Gravity, UA 1 015 1 003 - 1 030   Urine Microscopic    Collection Time: 06/21/18  8:43 PM   Result Value Ref Range    RBC, UA 0-1 (A) None Seen, 0-5 /hpf    WBC, UA 0-5 None Seen, 0-5, 5-55, 5-65 /hpf    Epithelial Cells Occasional None Seen, Occasional /hpf    Bacteria, UA None Seen None Seen, Occasional /hpf   CBC and differential    Collection Time: 06/21/18  9:07 PM   Result Value Ref Range    WBC 8 11 4 31 - 10 16 Thousand/uL    RBC 4 58 3 81 - 5 12 Million/uL    Hemoglobin 10 5 (L) 11 5 - 15 4 g/dL    Hematocrit 33 8 (L) 34 8 - 46 1 %    MCV 74 (L) 82 - 98 fL    MCH 22 9 (L) 26 8 - 34 3 pg    MCHC 31 1 (L) 31 4 - 37 4 g/dL    RDW 17 4 (H) 11 6 - 15 1 %    MPV 10 1 8 9 - 12 7 fL    Platelets 576 809 - 009 Thousands/uL    Neutrophils Relative 60 43 - 75 %    Lymphocytes Relative 28 14 - 44 %    Monocytes Relative 6 4 - 12 %    Eosinophils Relative 5 0 - 6 %    Basophils Relative 1 0 - 1 %    Neutrophils Absolute 4 89 1 85 - 7 62 Thousands/µL    Lymphocytes Absolute 2 24 0 60 - 4 47 Thousands/µL    Monocytes Absolute 0 50 0 17 - 1 22 Thousand/µL    Eosinophils Absolute 0 44 0 00 - 0 61 Thousand/µL    Basophils Absolute 0 04 0 00 - 0 10 Thousands/µL   Protime-INR    Collection Time: 06/21/18  9:07 PM   Result Value Ref Range    Protime 12 6 11 8 - 14 2 seconds    INR 0 97 0 86 - 1 17   APTT    Collection Time: 06/21/18  9:07 PM   Result Value Ref Range    PTT 26 24 - 36 seconds   Comprehensive metabolic panel    Collection Time: 06/21/18  9:07 PM   Result Value Ref Range    Sodium 140 136 - 145 mmol/L    Potassium 3 6 3 5 - 5 3 mmol/L    Chloride 105 100 - 108 mmol/L    CO2 25 21 - 32 mmol/L    Anion Gap 10 4 - 13 mmol/L    BUN 16 5 - 25 mg/dL    Creatinine 0 70 0 60 - 1 30 mg/dL    Glucose 100 65 - 140 mg/dL    Calcium 8 7 8 3 - 10 1 mg/dL    AST 24 5 - 45 U/L    ALT 28 12 - 78 U/L    Alkaline Phosphatase 97 46 - 116 U/L    Total Protein 6 6 6 4 - 8 2 g/dL    Albumin 3 2 (L) 3 5 - 5 0 g/dL    Total Bilirubin 0 30 0 20 - 1 00 mg/dL    eGFR 103 ml/min/1 73sq m   Troponin I    Collection Time: 06/21/18  9:07 PM   Result Value Ref Range    Troponin I <0 02 <=0 04 ng/mL   Magnesium    Collection Time: 06/21/18  9:07 PM   Result Value Ref Range    Magnesium 2 0 1 6 - 2 6 mg/dL   Phosphorus    Collection Time: 06/21/18  9:07 PM   Result Value Ref Range    Phosphorus 3 5 2 7 - 4 5 mg/dL   Vitamin B12    Collection Time: 06/21/18  9:07 PM   Result Value Ref Range    Vitamin B-12 287 100 - 900 pg/mL   Folate    Collection Time: 06/21/18  9:07 PM   Result Value Ref Range    Folate 19 8 (H) 3 1 - 17 5 ng/mL   Retic Count    Collection Time: 06/21/18  9:07 PM   Result Value Ref Range    Retic Ct Abs 37,400 85,988-13,254    Retic Ct Pct 0 81 0 37 - 1 87 %   D-dimer, quantitative    Collection Time: 06/21/18  9:07 PM   Result Value Ref Range    D-Dimer, Quant 296 0 - 424 ng/ml (FEU)   Iron Saturation %    Collection Time: 06/21/18  9:07 PM   Result Value Ref Range    Iron Saturation 4 %    TIBC 535 (H) 250 - 450 ug/dL    Iron 21 (L) 50 - 170 ug/dL   Ferritin    Collection Time: 06/21/18  9:07 PM   Result Value Ref Range    Ferritin 5 (L) 8 - 388 ng/mL   Platelet count    Collection Time: 06/22/18  2:16 AM   Result Value Ref Range    Platelets 548 028 - 413 Thousands/uL    MPV 10 8 8 9 - 12 7 fL   Troponin I    Collection Time: 06/22/18  2:22 AM   Result Value Ref Range    Troponin I <0 02 <=0 04 ng/mL     Lashell Willoughby   05/11/2018 14:13   Account Number: [de-identified]   Indication:   Chest pain, unspecified type; SOB (shortness of breath); Family history of early CAD   ICD Codes:   R07 9; R06 02; Z82 49   Technical Notes:     CONCLUSIONS     Negative exercise stress echocardiography for ischemia  Negative exercise ECG for ischemia     Normal exercise capacity with normal hemodynamic response       Treadmill Stress Protocol             Exercise Duration:9 min 00 METS: 10 1 Double Product   Resting heart rate: 85 Peak HR: 151 [id number]   Resting BP: 104 / 84 Peak BP: 131 / 65 87 % max pred     Chronotropic Response Index: 0 75 % (Abnormal if < 80%, < 62%   for patients on Beta Blockers )     Estimated Functional Capacity: 8 6     # of METS Achieved/Estimated Functional Cap 09845 % (Abnormal if < 85%)   4   [facility] Treadmill:   Symptoms: None   Reason For Termination: Test terminated due to fatigue     Protocol: Jas     Functional Capacity and Hemodynamic Response   Normal heart rate response to exercise  Normal blood pressures response to exercise  ECG Analysis   Resting ECG -NSR, normal axis   Exercise ECG-No abnormal ST/T wave changes with exercise   Arrhythmia - None     Visually Estimated LV Ejection Fraction Pre-Exercise is:55%     Echo Analysis at Rest   Normal left ventricular function  All myocardial segments contract normally at rest    Echo Analysis Post Exercise   Overall left ventricular function improves with exercise and chamber size   decreases with exercise  All myocardial segments contract normally at exercise  DOPPLER MEASUREMENTS   Heart Rate 85 0 bpm        Imaging: I have personally reviewed pertinent reports  EKG: NSR      Code Status: Level 1 - Full Code  Advance Directive and Living Will:      Power of :    POLST:      Counseling / Coordination of Care  Total floor / unit time spent today 60 minutes  Greater than 50% of total time was spent with the patient and / or family counseling and / or coordination of care

## 2019-10-08 RX ORDER — FERROUS SULFATE 325(65) MG
325 TABLET ORAL
COMMUNITY

## 2019-10-08 RX ORDER — DOCUSATE SODIUM 100 MG/1
100 CAPSULE, LIQUID FILLED ORAL
COMMUNITY

## 2019-10-09 ENCOUNTER — OFFICE VISIT (OUTPATIENT)
Dept: SURGERY | Facility: CLINIC | Age: 49
End: 2019-10-09

## 2019-10-09 VITALS
HEART RATE: 88 BPM | BODY MASS INDEX: 27.66 KG/M2 | RESPIRATION RATE: 16 BRPM | SYSTOLIC BLOOD PRESSURE: 118 MMHG | DIASTOLIC BLOOD PRESSURE: 72 MMHG | WEIGHT: 166 LBS | HEIGHT: 65 IN

## 2019-10-09 DIAGNOSIS — D50.9 IRON DEFICIENCY ANEMIA, UNSPECIFIED IRON DEFICIENCY ANEMIA TYPE: Primary | ICD-10-CM

## 2019-10-09 PROCEDURE — 99213 OFFICE O/P EST LOW 20 MIN: CPT | Performed by: SPECIALIST

## 2019-10-15 ENCOUNTER — TRANSCRIBE ORDERS (OUTPATIENT)
Dept: ADMINISTRATIVE | Facility: HOSPITAL | Age: 49
End: 2019-10-15

## 2019-10-15 DIAGNOSIS — R10.31 ABDOMINAL PAIN, RIGHT LOWER QUADRANT: ICD-10-CM

## 2019-10-15 DIAGNOSIS — R14.0 GASTRIC TYMPANY: ICD-10-CM

## 2019-10-15 DIAGNOSIS — R10.11 ABDOMINAL PAIN, RIGHT UPPER QUADRANT: Primary | ICD-10-CM

## 2019-10-15 DIAGNOSIS — R19.4 FREQUENT BOWEL MOVEMENTS: ICD-10-CM

## 2019-10-17 NOTE — PROGRESS NOTES
Chief Complaint:  Anemia status post laparoscopic Geeta-en-Y gastric bypass  History of Present Illness:   Patient is a 49-year-old white female who underwent laparoscopic Geeta-en-Y gastric bypass March 15, 2007 for morbid obesity  At that time she weighed 268 lb  She was last seen in October 2010 weighed 147 lb  She was  to May who is also a bariatric patient of ours  Jez Tate presents today with a history of increased fatigability and shortness of breath  She was found to have iron deficiency anemia  He was recommended to her that she undergo upper and lower endoscopy and she is here today to discuss the anemia and also the approach to diagnosis inguinal the etiology of the anemia etc     She has been followed by an endocrinologist at [facility]  She admits to eating adequate  She denies nausea vomiting diarrhea constipation  Past Medical History:   Past Medical History:   Diagnosis Date    Anemia     Anxiety     Disease of thyroid gland          Past Surgical History:    Past Surgical History:   Procedure Laterality Date    GASTRIC BYPASS      HYSTERECTOMY           Allergies:     Allergies   Allergen Reactions    Tetracyclines & Related Anaphylaxis    Bee Venom     Gabapentin     Latex Hives     rash--CONTACT ONLY      Methylprednisolone Hives    Neomycin-Bacitracin Zn-Polymyx     Omeprazole Hives    Percocet [Oxycodone-Acetaminophen] GI Intolerance         Medications:    Current Outpatient Medications:     Cholecalciferol 2000 units CAPS, Take 1 capsule by mouth daily, Disp: , Rfl:     docusate sodium (COLACE) 100 mg capsule, Take 100 mg by mouth, Disp: , Rfl:     ferrous sulfate 325 (65 Fe) mg tablet, Take 325 mg by mouth, Disp: , Rfl:       Social History:  Social History     Social History     Substance and Sexual Activity   Alcohol Use Yes    Comment: occassional     Social History     Substance and Sexual Activity   Drug Use No     Social History     Tobacco Use   Smoking Status Current Every Day Smoker   Smokeless Tobacco Never Used         Family History:  No family history on file  Review of Systems:    negative except for the history of present illness  She admits to easy fatigability and shortness of breath with activity  She also admits to a small bulge that comes and goes in her abdomen near 1 of her previous laparoscopic incisions  She has actually gained about 20 lb since last seen in the office about 9 years ago  She denies fever chills night sweats chest pain nausea vomiting diarrhea constipation hematuria dysuria melena hematochezia sore throat blurry vision  All other review of systems negative    Vitals:  Vitals:    10/09/19 1355   BP: 118/72   Pulse: 88   Resp: 16       Physical Exam:  Patient is a healthy-appearing middle-aged white female awake alert no distress  Vital signs as above  Skin warm dry  Color normal   Head normocephalic and atraumatic  Eyes BRAVO a m  intact  Ears and nose within normal limits  Throat gag reflex intact  Neck no masses thyromegaly or lymphadenopathy palpable  Back no CVA or spinal tenderness  Lungs clear to a and P  Cor regular rate and rhythm no murmurs carotid bruits  Abdomen scars consistent with previous laparoscopic surgery  Some pannus is noted  Nontender  Extremities negative CC E  Neurologically A&O x3 cranial nerves 2-12 intact  Lymphatics no lymphadenopathy palpable      Lab Results: I have personally reviewed pertinent reports  See below  Imaging: I have personally reviewed pertinent reports  EKG, Pathology, and Other Studies: I have personally reviewed pertinent reports  No visits with results within 1 Day(s) from this visit     Latest known visit with results is:   Admission on 06/21/2018, Discharged on 06/23/2018   Component Date Value    WBC 06/21/2018 8 11     RBC 06/21/2018 4 58     Hemoglobin 06/21/2018 10 5*    Hematocrit 06/21/2018 33 8*    MCV 06/21/2018 74*    MCH 06/21/2018 22 9*    MCHC 06/21/2018 31 1*    RDW 06/21/2018 17 4*    MPV 06/21/2018 10 1     Platelets 76/46/2310 300     Neutrophils Relative 06/21/2018 60     Lymphocytes Relative 06/21/2018 28     Monocytes Relative 06/21/2018 6     Eosinophils Relative 06/21/2018 5     Basophils Relative 06/21/2018 1     Neutrophils Absolute 06/21/2018 4 89     Lymphocytes Absolute 06/21/2018 2 24     Monocytes Absolute 06/21/2018 0 50     Eosinophils Absolute 06/21/2018 0 44     Basophils Absolute 06/21/2018 0 04     Protime 06/21/2018 12 6     INR 06/21/2018 0 97     PTT 06/21/2018 26     Sodium 06/21/2018 140     Potassium 06/21/2018 3 6     Chloride 06/21/2018 105     CO2 06/21/2018 25     ANION GAP 06/21/2018 10     BUN 06/21/2018 16     Creatinine 06/21/2018 0 70     Glucose 06/21/2018 100     Calcium 06/21/2018 8 7     AST 06/21/2018 24     ALT 06/21/2018 28     Alkaline Phosphatase 06/21/2018 97     Total Protein 06/21/2018 6 6     Albumin 06/21/2018 3 2*    Total Bilirubin 06/21/2018 0 30     eGFR 06/21/2018 103     Troponin I 06/21/2018 <0 02     Color, UA 06/21/2018 Yellow     Clarity, UA 06/21/2018 Clear     pH, UA 06/21/2018 7 0     Leukocytes, UA 06/21/2018 Trace*    Nitrite, UA 06/21/2018 Negative     Protein, UA 06/21/2018 Negative     Glucose, UA 06/21/2018 Negative     Ketones, UA 06/21/2018 Negative     Urobilinogen, UA 06/21/2018 0 2     Bilirubin, UA 06/21/2018 Negative     Blood, UA 06/21/2018 Negative     Specific Gravity, UA 06/21/2018 1 015     RBC, UA 06/21/2018 0-1*    WBC, UA 06/21/2018 0-5     Epithelial Cells 06/21/2018 Occasional     Bacteria, UA 06/21/2018 None Seen     Troponin I 06/22/2018 <0 02     Magnesium 06/21/2018 2 0     Phosphorus 06/21/2018 3 5     Platelets 47/52/8336 296     MPV 06/22/2018 10 8     Vitamin B-12 06/21/2018 287     Folate 06/21/2018 19 8*    Retic Ct Abs 06/21/2018 37,400     Retic Ct Pct 06/21/2018 0 81  D-Dimer, Quant 06/21/2018 296     Iron Saturation 06/21/2018 4     TIBC 06/21/2018 535*    Iron 06/21/2018 21*    Ferritin 06/21/2018 5*    TSH 3RD GENERATON 06/22/2018 1 558     T3, Total 06/22/2018 0 90     25-HYDROXY VIT D 06/23/2018 36     25-Hydroxy D2 06/23/2018 <1 0     25-HYDROXY VIT D3 06/23/2018 36     WBC 06/23/2018 6 48     RBC 06/23/2018 4 59     Hemoglobin 06/23/2018 10 5*    Hematocrit 06/23/2018 34 1*    MCV 06/23/2018 74*    MCH 06/23/2018 22 9*    MCHC 06/23/2018 30 8*    RDW 06/23/2018 17 5*    MPV 06/23/2018 10 9     Platelets 08/05/0317 296     Neutrophils Relative 06/23/2018 40*    Lymphocytes Relative 06/23/2018 45*    Monocytes Relative 06/23/2018 6     Eosinophils Relative 06/23/2018 9*    Basophils Relative 06/23/2018 1     Neutrophils Absolute 06/23/2018 2 56     Lymphocytes Absolute 06/23/2018 2 93     Monocytes Absolute 06/23/2018 0 40     Eosinophils Absolute 06/23/2018 0 55     Basophils Absolute 06/23/2018 0 04     Sodium 06/23/2018 140     Potassium 06/23/2018 4 0     Chloride 06/23/2018 106     CO2 06/23/2018 26     ANION GAP 06/23/2018 8     BUN 06/23/2018 15     Creatinine 06/23/2018 0 74     Glucose 06/23/2018 77     Calcium 06/23/2018 9 4     AST 06/23/2018 21     ALT 06/23/2018 25     Alkaline Phosphatase 06/23/2018 97     Total Protein 06/23/2018 6 4     Albumin 06/23/2018 3 1*    Total Bilirubin 06/23/2018 0 20     eGFR 06/23/2018 96     Ventricular Rate 06/21/2018 74     Atrial Rate 06/21/2018 74     VT Interval 06/21/2018 176     QRSD Interval 06/21/2018 106     QT Interval 06/21/2018 378     QTC Interval 06/21/2018 419     P Axis 06/21/2018 69     QRS Axis 06/21/2018 40     T Wave Axis 06/21/2018 62          Impression:  Iron deficiency anemia, increased fatigability, shortness of breath  We discussed the etiology of her iron deficiency anemia    Her hemoglobin is 10 5 but her indices are low consistent with iron deficiency anemia  This is usually due to O cold blood loss from the GI tract  This usually mandate upper and lower endoscopy to verify of a cause (tumor, polyp, peptic ulcer disease etc etc)  Also in the occasional patient with gastric bypass poor iron absorption is the etiology specifically mentioning females  Plan:  Be that as it may the situation discussed with Wardóscar Meño  She needs an upper and lower endoscopy  We will arrange for this to occur ASAP and then gone from there

## 2019-10-18 ENCOUNTER — HOSPITAL ENCOUNTER (OUTPATIENT)
Dept: RADIOLOGY | Age: 49
Discharge: HOME | End: 2019-10-18
Payer: COMMERCIAL

## 2019-10-18 DIAGNOSIS — R19.4 FREQUENT BOWEL MOVEMENTS: ICD-10-CM

## 2019-10-18 DIAGNOSIS — R14.0 GASTRIC TYMPANY: ICD-10-CM

## 2019-10-18 DIAGNOSIS — R10.11 ABDOMINAL PAIN, RIGHT UPPER QUADRANT: ICD-10-CM

## 2019-10-18 DIAGNOSIS — R10.31 ABDOMINAL PAIN, RIGHT LOWER QUADRANT: ICD-10-CM

## 2019-10-18 PROCEDURE — 76700 US EXAM ABDOM COMPLETE: CPT

## 2020-10-09 ENCOUNTER — TRANSCRIBE ORDERS (OUTPATIENT)
Dept: ADMINISTRATIVE | Facility: HOSPITAL | Age: 50
End: 2020-10-09

## 2020-10-09 DIAGNOSIS — R59.0 LOCALIZED ENLARGED LYMPH NODES: Primary | ICD-10-CM

## 2020-10-12 ENCOUNTER — HOSPITAL ENCOUNTER (OUTPATIENT)
Dept: RADIOLOGY | Age: 50
Discharge: HOME | End: 2020-10-12
Payer: COMMERCIAL

## 2020-10-12 DIAGNOSIS — R59.0 LOCALIZED ENLARGED LYMPH NODES: ICD-10-CM

## 2020-10-12 PROCEDURE — 76536 US EXAM OF HEAD AND NECK: CPT

## 2021-04-02 ENCOUNTER — HOSPITAL ENCOUNTER (EMERGENCY)
Facility: HOSPITAL | Age: 51
Discharge: HOME | End: 2021-04-02
Attending: EMERGENCY MEDICINE
Payer: COMMERCIAL

## 2021-04-02 ENCOUNTER — APPOINTMENT (EMERGENCY)
Dept: CT IMAGING | Facility: HOSPITAL | Age: 51
End: 2021-04-02
Payer: COMMERCIAL

## 2021-04-02 VITALS
WEIGHT: 175 LBS | BODY MASS INDEX: 29.16 KG/M2 | HEIGHT: 65 IN | DIASTOLIC BLOOD PRESSURE: 89 MMHG | SYSTOLIC BLOOD PRESSURE: 161 MMHG | RESPIRATION RATE: 16 BRPM | OXYGEN SATURATION: 99 % | TEMPERATURE: 98.9 F | HEART RATE: 77 BPM

## 2021-04-02 DIAGNOSIS — R51.9 HEADACHE: Primary | ICD-10-CM

## 2021-04-02 DIAGNOSIS — Z87.39 HISTORY OF NECK PAIN: ICD-10-CM

## 2021-04-02 LAB
ALBUMIN SERPL BCP-MCNC: 3.3 G/DL (ref 3.5–5)
ALP SERPL-CCNC: 116 U/L (ref 46–116)
ALT SERPL W P-5'-P-CCNC: 33 U/L (ref 12–78)
ANION GAP SERPL CALCULATED.3IONS-SCNC: 7 MMOL/L (ref 4–13)
AST SERPL W P-5'-P-CCNC: 24 U/L (ref 5–45)
BASOPHILS # BLD AUTO: 0.06 THOUSANDS/ΜL (ref 0–0.1)
BASOPHILS NFR BLD AUTO: 1 % (ref 0–1)
BILIRUB SERPL-MCNC: 0.38 MG/DL (ref 0.2–1)
BUN SERPL-MCNC: 18 MG/DL (ref 5–25)
CALCIUM ALBUM COR SERPL-MCNC: 10.2 MG/DL (ref 8.3–10.1)
CALCIUM SERPL-MCNC: 9.6 MG/DL (ref 8.3–10.1)
CHLORIDE SERPL-SCNC: 104 MMOL/L (ref 100–108)
CO2 SERPL-SCNC: 30 MMOL/L (ref 21–32)
CREAT SERPL-MCNC: 0.84 MG/DL (ref 0.6–1.3)
EOSINOPHIL # BLD AUTO: 0.57 THOUSAND/ΜL (ref 0–0.61)
EOSINOPHIL NFR BLD AUTO: 7 % (ref 0–6)
ERYTHROCYTE [DISTWIDTH] IN BLOOD BY AUTOMATED COUNT: 17 % (ref 11.6–15.1)
GFR SERPL CREATININE-BSD FRML MDRD: 81 ML/MIN/1.73SQ M
GLUCOSE SERPL-MCNC: 77 MG/DL (ref 65–140)
HCT VFR BLD AUTO: 41.1 % (ref 34.8–46.1)
HGB BLD-MCNC: 12.1 G/DL (ref 11.5–15.4)
IMM GRANULOCYTES # BLD AUTO: 0.07 THOUSAND/UL (ref 0–0.2)
IMM GRANULOCYTES NFR BLD AUTO: 1 % (ref 0–2)
LYMPHOCYTES # BLD AUTO: 3.2 THOUSANDS/ΜL (ref 0.6–4.47)
LYMPHOCYTES NFR BLD AUTO: 42 % (ref 14–44)
MCH RBC QN AUTO: 22.5 PG (ref 26.8–34.3)
MCHC RBC AUTO-ENTMCNC: 29.4 G/DL (ref 31.4–37.4)
MCV RBC AUTO: 76 FL (ref 82–98)
MONOCYTES # BLD AUTO: 0.6 THOUSAND/ΜL (ref 0.17–1.22)
MONOCYTES NFR BLD AUTO: 8 % (ref 4–12)
NEUTROPHILS # BLD AUTO: 3.19 THOUSANDS/ΜL (ref 1.85–7.62)
NEUTS SEG NFR BLD AUTO: 41 % (ref 43–75)
NRBC BLD AUTO-RTO: 0 /100 WBCS
PLATELET # BLD AUTO: 410 THOUSANDS/UL (ref 149–390)
PMV BLD AUTO: 9.7 FL (ref 8.9–12.7)
POTASSIUM SERPL-SCNC: 4 MMOL/L (ref 3.5–5.3)
PROT SERPL-MCNC: 7.2 G/DL (ref 6.4–8.2)
RBC # BLD AUTO: 5.38 MILLION/UL (ref 3.81–5.12)
SODIUM SERPL-SCNC: 141 MMOL/L (ref 136–145)
WBC # BLD AUTO: 7.69 THOUSAND/UL (ref 4.31–10.16)

## 2021-04-02 PROCEDURE — 96375 TX/PRO/DX INJ NEW DRUG ADDON: CPT

## 2021-04-02 PROCEDURE — 85025 COMPLETE CBC W/AUTO DIFF WBC: CPT | Performed by: PHYSICIAN ASSISTANT

## 2021-04-02 PROCEDURE — 99285 EMERGENCY DEPT VISIT HI MDM: CPT | Performed by: PHYSICIAN ASSISTANT

## 2021-04-02 PROCEDURE — 80053 COMPREHEN METABOLIC PANEL: CPT | Performed by: PHYSICIAN ASSISTANT

## 2021-04-02 PROCEDURE — 70450 CT HEAD/BRAIN W/O DYE: CPT

## 2021-04-02 PROCEDURE — 36415 COLL VENOUS BLD VENIPUNCTURE: CPT | Performed by: PHYSICIAN ASSISTANT

## 2021-04-02 PROCEDURE — 96365 THER/PROPH/DIAG IV INF INIT: CPT

## 2021-04-02 PROCEDURE — 99284 EMERGENCY DEPT VISIT MOD MDM: CPT

## 2021-04-02 RX ORDER — SENNOSIDES 8.6 MG
650 CAPSULE ORAL EVERY 8 HOURS PRN
Qty: 9 EACH | Refills: 0 | Status: SHIPPED | OUTPATIENT
Start: 2021-04-02 | End: 2021-04-05

## 2021-04-02 RX ORDER — KETOROLAC TROMETHAMINE 30 MG/ML
30 INJECTION, SOLUTION INTRAMUSCULAR; INTRAVENOUS ONCE
Status: DISCONTINUED | OUTPATIENT
Start: 2021-04-02 | End: 2021-04-02

## 2021-04-02 RX ORDER — DIPHENHYDRAMINE HYDROCHLORIDE 50 MG/ML
25 INJECTION INTRAMUSCULAR; INTRAVENOUS ONCE
Status: COMPLETED | OUTPATIENT
Start: 2021-04-02 | End: 2021-04-02

## 2021-04-02 RX ORDER — MAGNESIUM SULFATE HEPTAHYDRATE 40 MG/ML
2 INJECTION, SOLUTION INTRAVENOUS ONCE
Status: COMPLETED | OUTPATIENT
Start: 2021-04-02 | End: 2021-04-02

## 2021-04-02 RX ORDER — DEXAMETHASONE SODIUM PHOSPHATE 4 MG/ML
10 INJECTION, SOLUTION INTRA-ARTICULAR; INTRALESIONAL; INTRAMUSCULAR; INTRAVENOUS; SOFT TISSUE ONCE
Status: COMPLETED | OUTPATIENT
Start: 2021-04-02 | End: 2021-04-02

## 2021-04-02 RX ORDER — METOCLOPRAMIDE HYDROCHLORIDE 5 MG/ML
10 INJECTION INTRAMUSCULAR; INTRAVENOUS ONCE
Status: COMPLETED | OUTPATIENT
Start: 2021-04-02 | End: 2021-04-02

## 2021-04-02 RX ADMIN — METOCLOPRAMIDE HYDROCHLORIDE 10 MG: 5 INJECTION INTRAMUSCULAR; INTRAVENOUS at 05:19

## 2021-04-02 RX ADMIN — MAGNESIUM SULFATE HEPTAHYDRATE 2 G: 40 INJECTION, SOLUTION INTRAVENOUS at 05:33

## 2021-04-02 RX ADMIN — DIPHENHYDRAMINE HYDROCHLORIDE 25 MG: 50 INJECTION, SOLUTION INTRAMUSCULAR; INTRAVENOUS at 05:16

## 2021-04-02 RX ADMIN — SODIUM CHLORIDE 1000 ML: 0.9 INJECTION, SOLUTION INTRAVENOUS at 05:15

## 2021-04-02 RX ADMIN — DEXAMETHASONE SODIUM PHOSPHATE 10 MG: 4 INJECTION INTRA-ARTICULAR; INTRALESIONAL; INTRAMUSCULAR; INTRAVENOUS; SOFT TISSUE at 05:26

## 2021-04-02 NOTE — ED PROVIDER NOTES
History  Chief Complaint   Patient presents with    Neck Pain     Patient reports ongoing issues with neck pain radiating to base of skull  Seen by pcp and prescribed muscle relaxant and antiinflammatory meds - taking as prescribed w/o relief   Headache     Patient is a 70-year-old female with history of anemia and gastric bypass that presents emergency department with dull and aching throbbing generalized head pain for 1 day  Patient verbalizes that current headache is different from those previously experienced  Patient also verbalizes that she had went to her PCP last week for neck pain to which she was prescribed prednisone taper and cyclobenzaprine with patient reporting moderate amount of relief from neck pain  Patient presents with  this evening that provides part patient history, and further reports that patient has ongoing neck pain symptoms with the past year and half stemming from a time where she lost her balance and almost fell "  Patient denies history of concussion, seizures, and stroke  Patient and patient  denies patient denies gait instability and denies slurring speech  Patient denies history of migraines  Patient denies palliative and provocative factors  Patient affirms not effective treatment of Medrol Dosepak and cyclobenzaprine  Patient denies fevers, chills, nausea, vomiting, diarrhea, constipation urinary symptoms  Patient's recent fall or recent trauma  Patient denies sick contacts or recent travel  Patient denies tinnitus, dizziness, meningeal, and vertiginous symptoms  Patient denies vision changes  Patient denies vision loss  Patient denies numbness, and loss of power  Patient denies neck pain at this time concerns more focused current head pain  Patient denies chest pain, shortness of breath, and abdominal pain      History provided by:  Patient   used: No    Headache  Pain location:  Generalized  Quality:  Dull  Severity currently:  5/10  Severity at highest:  5/10  Onset quality:  Gradual  Duration:  1 day  Timing:  Constant  Progression:  Unchanged  Chronicity:  New  Similar to prior headaches: no    Relieved by:  Nothing  Worsened by:  Nothing  Ineffective treatments: medrol dose pack and cyclobenzoprine  Associated symptoms: no abdominal pain, no back pain, no blurred vision, no congestion, no cough, no diarrhea, no dizziness, no drainage, no eye pain, no fever, no nausea, no neck pain, no neck stiffness, no numbness, no paresthesias, no photophobia, no sinus pressure, no sore throat, no tingling, no vomiting and no weakness           Prior to Admission Medications   Prescriptions Last Dose Informant Patient Reported? Taking? Cholecalciferol 2000 units CAPS   Yes Yes   Sig: Take 1 capsule by mouth daily   docusate sodium (COLACE) 100 mg capsule   Yes Yes   Sig: Take 100 mg by mouth   ferrous sulfate 325 (65 Fe) mg tablet Not Taking at Unknown time  Yes No   Sig: Take 325 mg by mouth   omeprazole (PriLOSEC) 40 MG capsule   Yes Yes   Sig: Take 40 mg by mouth daily      Facility-Administered Medications: None       Past Medical History:   Diagnosis Date    Anemia     Anxiety     Disease of thyroid gland        Past Surgical History:   Procedure Laterality Date    GASTRIC BYPASS      HYSTERECTOMY         History reviewed  No pertinent family history  I have reviewed and agree with the history as documented  E-Cigarette/Vaping     E-Cigarette/Vaping Substances     Social History     Tobacco Use    Smoking status: Current Every Day Smoker     Packs/day: 0 25     Types: Cigarettes    Smokeless tobacco: Never Used   Substance Use Topics    Alcohol use: Yes     Comment: occassional    Drug use: No       Review of Systems   Constitutional: Negative for activity change, appetite change, chills and fever  HENT: Negative for congestion, postnasal drip, rhinorrhea, sinus pressure, sinus pain, sore throat and tinnitus  Eyes: Negative for blurred vision, photophobia, pain and visual disturbance  Respiratory: Negative for cough, chest tightness and shortness of breath  Cardiovascular: Negative for chest pain and palpitations  Gastrointestinal: Negative for abdominal pain, constipation, diarrhea, nausea and vomiting  Genitourinary: Negative for difficulty urinating, dysuria, flank pain, frequency and urgency  Musculoskeletal: Negative for back pain, gait problem, neck pain and neck stiffness  Skin: Negative for pallor and rash  Allergic/Immunologic: Negative for environmental allergies and food allergies  Neurological: Positive for headaches  Negative for dizziness, weakness, numbness and paresthesias  Psychiatric/Behavioral: Negative for confusion  All other systems reviewed and are negative  Physical Exam  Physical Exam  Vitals signs and nursing note reviewed  Constitutional:       General: She is awake  Appearance: Normal appearance  She is well-developed  She is not ill-appearing, toxic-appearing or diaphoretic  Comments: /89 (BP Location: Right arm)   Pulse 77   Temp 98 9 °F (37 2 °C) (Oral)   Resp 16   Ht 5' 5" (1 651 m)   Wt 79 4 kg (175 lb)   SpO2 99%   BMI 29 12 kg/m²      HENT:      Head: Normocephalic and atraumatic  Right Ear: Hearing, tympanic membrane, ear canal and external ear normal  No decreased hearing noted  No drainage, swelling or tenderness  No mastoid tenderness  Left Ear: Hearing, tympanic membrane, ear canal and external ear normal  No decreased hearing noted  No drainage, swelling or tenderness  No mastoid tenderness  Nose: Nose normal       Mouth/Throat:      Lips: Pink  Mouth: Mucous membranes are moist       Pharynx: Oropharynx is clear  Uvula midline  Eyes:      General: Lids are normal  Vision grossly intact  Right eye: No discharge  Left eye: No discharge        Extraocular Movements: Extraocular movements intact  Conjunctiva/sclera: Conjunctivae normal       Pupils: Pupils are equal, round, and reactive to light  Neck:      Musculoskeletal: Full passive range of motion without pain, normal range of motion and neck supple  Normal range of motion  No neck rigidity, spinous process tenderness or muscular tenderness  Vascular: No carotid bruit or JVD  Trachea: Trachea and phonation normal  No tracheal tenderness or tracheal deviation  Meningeal: Brudzinski's sign absent  Comments: No tenderness with passive and active cervical ROM with head turning approximately 45 degree angles to the left and right  No cervical tenderness with cranial axial loading    Cardiovascular:      Rate and Rhythm: Normal rate and regular rhythm  Pulses: Normal pulses  Radial pulses are 2+ on the right side and 2+ on the left side  Posterior tibial pulses are 2+ on the right side and 2+ on the left side  Heart sounds: Normal heart sounds  Pulmonary:      Effort: Pulmonary effort is normal       Breath sounds: Normal breath sounds  No stridor  No decreased breath sounds, wheezing, rhonchi or rales  Chest:      Chest wall: No tenderness  Abdominal:      General: Abdomen is flat  Bowel sounds are normal  There is no distension  Palpations: Abdomen is soft  Abdomen is not rigid  Tenderness: There is no abdominal tenderness  There is no guarding or rebound  Musculoskeletal: Normal range of motion  Comments: Passive ROM intact  Upper and lower extremity 4/5 bilaterally  Neurovascularly intact  No grinding or clicking of joints       Lymphadenopathy:      Head:      Right side of head: No submental, submandibular, tonsillar, preauricular, posterior auricular or occipital adenopathy  Left side of head: No submental, submandibular, tonsillar, preauricular, posterior auricular or occipital adenopathy  Cervical: No cervical adenopathy        Right cervical: No superficial, deep or posterior cervical adenopathy  Left cervical: No superficial, deep or posterior cervical adenopathy  Skin:     General: Skin is warm  Capillary Refill: Capillary refill takes less than 2 seconds  Neurological:      General: No focal deficit present  Mental Status: She is alert and oriented to person, place, and time  GCS: GCS eye subscore is 4  GCS verbal subscore is 5  GCS motor subscore is 6  Cranial Nerves: Cranial nerves are intact  Sensory: Sensation is intact  No sensory deficit  Motor: Motor function is intact  Coordination: Coordination is intact  Gait: Gait is intact  Deep Tendon Reflexes: Reflexes are normal and symmetric  Reflex Scores:       Patellar reflexes are 2+ on the right side and 2+ on the left side  Psychiatric:         Mood and Affect: Mood normal          Speech: Speech normal          Behavior: Behavior normal  Behavior is cooperative  Thought Content:  Thought content normal          Judgment: Judgment normal          Vital Signs  ED Triage Vitals [04/02/21 0448]   Temperature Pulse Respirations Blood Pressure SpO2   98 9 °F (37 2 °C) 77 16 161/89 99 %      Temp Source Heart Rate Source Patient Position - Orthostatic VS BP Location FiO2 (%)   Oral Monitor Sitting Right arm --      Pain Score       Worst Possible Pain           Vitals:    04/02/21 0448   BP: 161/89   Pulse: 77   Patient Position - Orthostatic VS: Sitting         Visual Acuity  Visual Acuity      Most Recent Value   L Pupil Size (mm)  3   R Pupil Size (mm)  3          ED Medications  Medications   sodium chloride 0 9 % bolus 1,000 mL (0 mL Intravenous Stopped 4/2/21 0630)   diphenhydrAMINE (BENADRYL) injection 25 mg (25 mg Intravenous Given 4/2/21 0516)   metoclopramide (REGLAN) injection 10 mg (10 mg Intravenous Given 4/2/21 0519)   magnesium sulfate 2 g/50 mL IVPB (premix) 2 g (0 g Intravenous Stopped 4/2/21 0630)   dexamethasone (DECADRON) injection 10 mg (10 mg Intravenous Given 4/2/21 0526)       Diagnostic Studies  Results Reviewed     Procedure Component Value Units Date/Time    Comprehensive metabolic panel [[id number]]  (Abnormal) Collected: 04/02/21 0510    Lab Status: Final result Specimen: Blood from Arm, Right Updated: 04/02/21 0606     Sodium 141 mmol/L      Potassium 4 0 mmol/L      Chloride 104 mmol/L      CO2 30 mmol/L      ANION GAP 7 mmol/L      BUN 18 mg/dL      Creatinine 0 84 mg/dL      Glucose 77 mg/dL      Calcium 9 6 mg/dL      Corrected Calcium 10 2 mg/dL      AST 24 U/L      ALT 33 U/L      Alkaline Phosphatase 116 U/L      Total Protein 7 2 g/dL      Albumin 3 3 g/dL      Total Bilirubin 0 38 mg/dL      eGFR 81 ml/min/1 73sq m     Narrative:      [place] guidelines for Chronic Kidney Disease (CKD):     Stage 1 with normal or high GFR (GFR > 90 mL/min/1 73 square meters)    Stage 2 Mild CKD (GFR = 60-89 mL/min/1 73 square meters)    Stage 3A Moderate CKD (GFR = 45-59 mL/min/1 73 square meters)    Stage 3B Moderate CKD (GFR = 30-44 mL/min/1 73 square meters)    Stage 4 Severe CKD (GFR = 15-29 mL/min/1 73 square meters)    Stage 5 End Stage CKD (GFR <15 mL/min/1 73 square meters)  Note: GFR calculation is accurate only with a steady state creatinine    CBC and differential [[id number]]  (Abnormal) Collected: 04/02/21 0510    Lab Status: Final result Specimen: Blood from Arm, Right Updated: 04/02/21 0557     WBC 7 69 Thousand/uL      RBC 5 38 Million/uL      Hemoglobin 12 1 g/dL      Hematocrit 41 1 %      MCV 76 fL      MCH 22 5 pg      MCHC 29 4 g/dL      RDW 17 0 %      MPV 9 7 fL      Platelets 390 Thousands/uL      nRBC 0 /100 WBCs      Neutrophils Relative 41 %      Immat GRANS % 1 %      Lymphocytes Relative 42 %      Monocytes Relative 8 %      Eosinophils Relative 7 %      Basophils Relative 1 %      Neutrophils Absolute 3 19 Thousands/µL      Immature Grans Absolute 0 07 Thousand/uL      Lymphocytes Absolute 3 20 Thousands/µL      Monocytes Absolute 0 60 Thousand/µL      Eosinophils Absolute 0 57 Thousand/µL      Basophils Absolute 0 06 Thousands/µL                  CT head wo contrast   ED Interpretation by Shena Vinson PA-C (04/02 4651)   CT BRAIN - WITHOUT CONTRAST     INDICATION:   generalized head pain; new headache, different from previously experienced        Patient reports ongoing issues with neck pain radiating to base of skull  Seen by primary care physician and prescribed muscle relaxant and anti-inflammatory medications  Taking medications as prescribed without relief  Headache       49-year-old female with history of anemia and gastric bypass, presenting to the emergency department with dull, achy, throbbing generalized head pain for one day  Verbalizes the current headache is different than those previously experienced  Also   verbalizes that she went to her primary care physician last week for neck pain to which she was prescribed prednisone taper and cyclobenzaprine with patient reporting moderate amount of relief from neck pain               COMPARISON:  CTA neck and brain June 22, 2018      TECHNIQUE:  CT examination of the brain was performed  In addition to axial images, sagittal and coronal 2D    reformatted images were created and submitted for interpretation      Radiation dose length product (DLP) for this visit:  911 mGy-cm   This examination, like all CT scans performed in the [facility], was performed utilizing techniques to minimize radiation dose exposure, including the use of iterative   reconstruction and automated exposure control        IMAGE QUALITY:  Diagnostic         FINDINGS:  PARENCHYMA:  No intracranial mass, mass effect or midline shift  No CT signs of acute infarction    No acute parenchymal hemorrhage         VENTRICLES AND EXTRA-AXIAL SPACES:  Normal for the patient's age         VISUALIZED ORBITS AND PARANASAL SINUSES:    The globes are symmetric and intact      The paranasal sinuses are well-pneumatized  Minimal mucosal thickening in the right-sided ethmoid air cells  This is slightly progressed  Left-sided ethmoid air cells are now clear, this has improved         CALVARIUM AND EXTRACRANIAL SOFT TISSUES:  Normal            IMPRESS   ION:  1  No acute intracranial abnormality  No significant change from priors CTA neck and brain  2   Slight progression of mild right-sided ethmoid sinus disease                  Workstation performed: [id number]         Final Result by Yasmany Holloway DO (04/02 0603)   1  No acute intracranial abnormality  No significant change from priors CTA neck and brain  2   Slight progression of mild right-sided ethmoid sinus disease  Workstation performed: [id number]                    Procedures  Procedures         ED Course  ED Course as of Apr 02 0709 Fri Apr 02, 2021   8969 CT head wo contrast-  impression- 1   No acute intracranial abnormality   No significant change from priors CTA neck and brain  2   Slight progression of mild right-sided ethmoid sinus disease "      9234 Patient with verbal understanding all clinical laboratory imaging findings at this time        3054 Patient verbalizes that she has seasonal allergies                    Stroke Assessment     Row Name 04/02/21 0541             NIH Stroke Scale    Interval  Baseline      Level of Consciousness (1a )  0      LOC Questions (1b )  0      LOC Commands (1c )  0      Best Gaze (2 )  0      Visual (3 )  0      Facial Palsy (4 )  0      Motor Arm, Left (5a )  0      Motor Arm, Right (5b )  0      Motor Leg, Left (6a )  0      Motor Leg, Right (6b )  0      Limb Ataxia (7 )  0      Sensory (8 )  0      Best Language (9 )  0      Dysarthria (10 )  0      Extinction and Inattention (11 ) (Formerly Neglect)  0      Total  0                    SBIRT 22yo+      Most Recent Value   SBIRT (25 yo +)   In order to provide better care to our patients, we are screening all of our patients for alcohol and drug use  Would it be okay to ask you these screening questions? Unable to answer at this time Filed at: 04/02/2021 0450                    MDM  Number of Diagnoses or Management Options  Headache: new and does not require workup  History of neck pain: new and does not require workup     Amount and/or Complexity of Data Reviewed  Clinical lab tests: ordered and reviewed  Tests in the radiology section of CPT®: ordered and reviewed  Review and summarize past medical records: yes  Independent visualization of images, tracings, or specimens: yes    Risk of Complications, Morbidity, and/or Mortality  Presenting problems: moderate  Diagnostic procedures: moderate  Management options: low    Patient Progress  Patient progress: stable    Patient is a 80-year-old female with history of anemia and gastric bypass that presents emergency department with dull and aching throbbing generalized head pain for 1 day  Patient verbalizes that current headache is different from those previously experienced  Patient also verbalizes that she had went to her PCP last week for neck pain to which she was prescribed prednisone taper and cyclobenzaprine with patient reporting moderate amount of relief from neck pain  Patient hemodynamically stable and afebrile  Nexus 0- imaging not required  NIH 0  No leukocytosis, no banding  Normal electrolytes, normal kidney function, normal BUN, normal glucose  CT head wo contrast- impression- "1  No acute intracranial abnormality  No significant change from priors CTA neck and brain  2    Slight progression of mild right-sided ethmoid sinus disease "  Patient denies history of gastric bypass; migraine cocktail including Reglan, Decadron, Benadryl, with magnesium delivered in ED along with 1 L bolus of normal saline solution; patient verbalized decrease in head pain symptoms status post medication delivery  Prescribed Tylenol counseled patient medication administration and side effects  Follow-up with PCP  Follow-up with neurology  Follow-up with emergency department symptoms persist or exacerbate  Patient demonstrates verbal understanding all clinical laboratory imaging findings, discharge instructions, follow-up and verbalized agreement current treatment plan      Disposition  Final diagnoses:   Headache   History of neck pain     Time reflects when diagnosis was documented in both MDM as applicable and the Disposition within this note     Time User Action Codes Description Comment    4/2/2021  6:49 AM Glen Desai Add [R51 9] Headache     4/2/2021  6:49 AM Glen Desai Add [Z87 39] History of neck pain       ED Disposition     ED Disposition Condition Date/Time Comment    Discharge Stable Fri Apr 2, 2021  6:49 AM Izabel Back discharge to home/self care              Follow-up Information     Follow up With Specialties Details Why Contact Info Additional [address], DO Family Medicine Call   [address] [address] [address] Emergency Department Emergency Medicine   [address] [facility] [id number] [place] [address] Emergency Department, [address], [place], [place], [address]    Neurology [facility] Neurology   [address] [place] [address] [address]  [phone] Neurology [facility], [address] [place] [address], [address]          Discharge Medication List as of 4/2/2021  6:51 AM      START taking these medications    Details   acetaminophen (TYLENOL) 650 mg CR tablet Take 1 tablet (650 mg total) by mouth every 8 (eight) hours as needed for mild pain for up to 3 days, Starting Fri 4/2/2021, Until Mon 4/5/2021, Normal         CONTINUE these medications which have NOT CHANGED Details   Cholecalciferol 2000 units CAPS Take 1 capsule by mouth daily, Historical Med      docusate sodium (COLACE) 100 mg capsule Take 100 mg by mouth, Historical Med      omeprazole (PriLOSEC) 40 MG capsule Take 40 mg by mouth daily, Starting Mon 8/24/2020, Historical Med      ferrous sulfate 325 (65 Fe) mg tablet Take 325 mg by mouth, Historical Med           No discharge procedures on file      PDMP Review       Value Time User    PDMP Reviewed  Yes 4/2/2021  4:44 AM Sabine De Leon MD          ED Provider  Electronically Signed by           Larisa Boss PA-C  04/02/21 5677

## 2021-04-02 NOTE — DISCHARGE INSTRUCTIONS
Take Tylenol as indicated  Follow-up with neurology  Follow-up with PCP  Follow up emergency department symptoms persist or exacerbate

## 2021-05-28 ENCOUNTER — HOSPITAL ENCOUNTER (OUTPATIENT)
Dept: RADIOLOGY | Age: 51
Discharge: HOME | End: 2021-05-28
Payer: COMMERCIAL

## 2021-05-28 ENCOUNTER — TRANSCRIBE ORDERS (OUTPATIENT)
Dept: ADMINISTRATIVE | Facility: HOSPITAL | Age: 51
End: 2021-05-28

## 2021-05-28 DIAGNOSIS — E04.1 NONTOXIC SINGLE THYROID NODULE: ICD-10-CM

## 2021-05-28 DIAGNOSIS — Z12.31 ENCOUNTER FOR SCREENING MAMMOGRAM FOR MALIGNANT NEOPLASM OF BREAST: Primary | ICD-10-CM

## 2021-05-28 PROCEDURE — 76536 US EXAM OF HEAD AND NECK: CPT

## 2021-05-29 ENCOUNTER — APPOINTMENT (OUTPATIENT)
Dept: LAB | Facility: MEDICAL CENTER | Age: 51
End: 2021-05-29
Payer: COMMERCIAL

## 2021-05-29 ENCOUNTER — TRANSCRIBE ORDERS (OUTPATIENT)
Dept: ADMINISTRATIVE | Facility: HOSPITAL | Age: 51
End: 2021-05-29

## 2021-05-29 DIAGNOSIS — E04.1 THYROID NODULE: ICD-10-CM

## 2021-05-29 DIAGNOSIS — Z98.84 STATUS POST BARIATRIC SURGERY: Primary | ICD-10-CM

## 2021-05-29 DIAGNOSIS — Z98.84 STATUS POST BARIATRIC SURGERY: ICD-10-CM

## 2021-05-29 LAB
25(OH)D3 SERPL-MCNC: 24.9 NG/ML (ref 30–100)
ALBUMIN SERPL BCP-MCNC: 3.3 G/DL (ref 3.5–5)
ALP SERPL-CCNC: 106 U/L (ref 46–116)
ALT SERPL W P-5'-P-CCNC: 24 U/L (ref 12–78)
ANION GAP SERPL CALCULATED.3IONS-SCNC: 3 MMOL/L (ref 4–13)
AST SERPL W P-5'-P-CCNC: 22 U/L (ref 5–45)
BILIRUB SERPL-MCNC: 0.33 MG/DL (ref 0.2–1)
BUN SERPL-MCNC: 13 MG/DL (ref 5–25)
CALCIUM ALBUM COR SERPL-MCNC: 9.9 MG/DL (ref 8.3–10.1)
CALCIUM SERPL-MCNC: 9.3 MG/DL (ref 8.3–10.1)
CHLORIDE SERPL-SCNC: 111 MMOL/L (ref 100–108)
CO2 SERPL-SCNC: 29 MMOL/L (ref 21–32)
CREAT SERPL-MCNC: 0.85 MG/DL (ref 0.6–1.3)
ERYTHROCYTE [DISTWIDTH] IN BLOOD BY AUTOMATED COUNT: 17.4 % (ref 11.6–15.1)
FERRITIN SERPL-MCNC: 5 NG/ML (ref 8–388)
FOLATE SERPL-MCNC: >20 NG/ML (ref 3.1–17.5)
GFR SERPL CREATININE-BSD FRML MDRD: 80 ML/MIN/1.73SQ M
GLUCOSE P FAST SERPL-MCNC: 79 MG/DL (ref 65–99)
HCT VFR BLD AUTO: 39.2 % (ref 34.8–46.1)
HGB BLD-MCNC: 11.4 G/DL (ref 11.5–15.4)
IRON SATN MFR SERPL: 5 %
IRON SERPL-MCNC: 27 UG/DL (ref 50–170)
MCH RBC QN AUTO: 23 PG (ref 26.8–34.3)
MCHC RBC AUTO-ENTMCNC: 29.1 G/DL (ref 31.4–37.4)
MCV RBC AUTO: 79 FL (ref 82–98)
PLATELET # BLD AUTO: 384 THOUSANDS/UL (ref 149–390)
PMV BLD AUTO: 11 FL (ref 8.9–12.7)
POTASSIUM SERPL-SCNC: 4.7 MMOL/L (ref 3.5–5.3)
PROT SERPL-MCNC: 7 G/DL (ref 6.4–8.2)
RBC # BLD AUTO: 4.96 MILLION/UL (ref 3.81–5.12)
SODIUM SERPL-SCNC: 143 MMOL/L (ref 136–145)
T4 FREE SERPL-MCNC: 0.88 NG/DL (ref 0.76–1.46)
TIBC SERPL-MCNC: 562 UG/DL (ref 250–450)
TSH SERPL DL<=0.05 MIU/L-ACNC: 2.73 UIU/ML (ref 0.36–3.74)
VIT B12 SERPL-MCNC: 215 PG/ML (ref 100–900)
WBC # BLD AUTO: 5.96 THOUSAND/UL (ref 4.31–10.16)

## 2021-05-29 PROCEDURE — 82728 ASSAY OF FERRITIN: CPT

## 2021-05-29 PROCEDURE — 82607 VITAMIN B-12: CPT

## 2021-05-29 PROCEDURE — 85027 COMPLETE CBC AUTOMATED: CPT

## 2021-05-29 PROCEDURE — 82306 VITAMIN D 25 HYDROXY: CPT

## 2021-05-29 PROCEDURE — 36415 COLL VENOUS BLD VENIPUNCTURE: CPT

## 2021-05-29 PROCEDURE — 82746 ASSAY OF FOLIC ACID SERUM: CPT

## 2021-05-29 PROCEDURE — 83540 ASSAY OF IRON: CPT

## 2021-05-29 PROCEDURE — 83550 IRON BINDING TEST: CPT

## 2021-05-29 PROCEDURE — 84443 ASSAY THYROID STIM HORMONE: CPT

## 2021-05-29 PROCEDURE — 80053 COMPREHEN METABOLIC PANEL: CPT

## 2021-05-29 PROCEDURE — 84439 ASSAY OF FREE THYROXINE: CPT

## 2021-06-04 ENCOUNTER — HOSPITAL ENCOUNTER (OUTPATIENT)
Dept: MAMMOGRAPHY | Facility: MEDICAL CENTER | Age: 51
Discharge: HOME | End: 2021-06-04

## 2021-06-04 VITALS — BODY MASS INDEX: 29.16 KG/M2 | WEIGHT: 175 LBS | HEIGHT: 65 IN

## 2021-06-04 DIAGNOSIS — Z12.31 ENCOUNTER FOR SCREENING MAMMOGRAM FOR MALIGNANT NEOPLASM OF BREAST: ICD-10-CM

## 2021-06-04 PROCEDURE — 77067 SCR MAMMO BI INCL CAD: CPT

## 2021-06-04 PROCEDURE — 77063 BREAST TOMOSYNTHESIS BI: CPT

## 2021-06-30 ENCOUNTER — HOSPITAL ENCOUNTER (OUTPATIENT)
Dept: ULTRASOUND IMAGING | Facility: CLINIC | Age: 51
Discharge: HOME | End: 2021-06-30

## 2021-06-30 ENCOUNTER — HOSPITAL ENCOUNTER (OUTPATIENT)
Dept: MAMMOGRAPHY | Facility: CLINIC | Age: 51
Discharge: HOME | End: 2021-06-30

## 2021-06-30 VITALS — WEIGHT: 175 LBS | BODY MASS INDEX: 29.16 KG/M2 | HEIGHT: 65 IN

## 2021-06-30 DIAGNOSIS — R92.8 ABNORMAL SCREENING MAMMOGRAM: ICD-10-CM

## 2021-06-30 PROCEDURE — 76642 ULTRASOUND BREAST LIMITED: CPT

## 2021-06-30 PROCEDURE — 77065 DX MAMMO INCL CAD UNI: CPT

## 2021-06-30 PROCEDURE — G0279 TOMOSYNTHESIS, MAMMO: HCPCS

## 2021-10-13 ENCOUNTER — APPOINTMENT (OUTPATIENT)
Dept: RADIOLOGY | Facility: MEDICAL CENTER | Age: 51
End: 2021-10-13
Payer: COMMERCIAL

## 2021-10-13 DIAGNOSIS — S20.212A CONTUSION OF LEFT FRONT WALL OF THORAX, INITIAL ENCOUNTER: ICD-10-CM

## 2021-10-13 PROCEDURE — 71101 X-RAY EXAM UNILAT RIBS/CHEST: CPT
